# Patient Record
Sex: MALE | Race: WHITE | NOT HISPANIC OR LATINO | URBAN - METROPOLITAN AREA
[De-identification: names, ages, dates, MRNs, and addresses within clinical notes are randomized per-mention and may not be internally consistent; named-entity substitution may affect disease eponyms.]

---

## 2020-11-10 ENCOUNTER — TELEMEDICINE (OUTPATIENT)
Dept: INTERNAL MEDICINE CLINIC | Facility: CLINIC | Age: 25
End: 2020-11-10
Payer: MEDICARE

## 2020-11-10 DIAGNOSIS — R50.9 FEVER, UNSPECIFIED FEVER CAUSE: Primary | ICD-10-CM

## 2020-11-10 PROCEDURE — 99213 OFFICE O/P EST LOW 20 MIN: CPT | Performed by: INTERNAL MEDICINE

## 2020-11-10 RX ORDER — ESCITALOPRAM OXALATE 10 MG/1
1 TABLET ORAL DAILY
COMMUNITY
Start: 2014-09-04

## 2020-11-10 RX ORDER — FLUTICASONE PROPIONATE 50 MCG
2 SPRAY, SUSPENSION (ML) NASAL DAILY
COMMUNITY
Start: 2014-10-02

## 2020-11-10 RX ORDER — LORAZEPAM 0.5 MG/1
1 TABLET ORAL DAILY PRN
COMMUNITY
Start: 2014-09-04

## 2020-11-10 RX ORDER — ALBUTEROL SULFATE 90 UG/1
2 AEROSOL, METERED RESPIRATORY (INHALATION)
COMMUNITY
Start: 2013-07-15

## 2020-11-10 RX ORDER — MULTIVITAMIN
1 TABLET ORAL DAILY
COMMUNITY

## 2020-11-11 ENCOUNTER — TELEPHONE (OUTPATIENT)
Dept: INTERNAL MEDICINE CLINIC | Facility: CLINIC | Age: 25
End: 2020-11-11

## 2020-11-11 DIAGNOSIS — J02.9 SORE THROAT: Primary | ICD-10-CM

## 2020-11-11 DIAGNOSIS — R50.9 FEVER, UNSPECIFIED FEVER CAUSE: ICD-10-CM

## 2020-11-11 PROCEDURE — U0003 INFECTIOUS AGENT DETECTION BY NUCLEIC ACID (DNA OR RNA); SEVERE ACUTE RESPIRATORY SYNDROME CORONAVIRUS 2 (SARS-COV-2) (CORONAVIRUS DISEASE [COVID-19]), AMPLIFIED PROBE TECHNIQUE, MAKING USE OF HIGH THROUGHPUT TECHNOLOGIES AS DESCRIBED BY CMS-2020-01-R: HCPCS | Performed by: INTERNAL MEDICINE

## 2020-11-11 RX ORDER — AZITHROMYCIN 250 MG/1
250 TABLET, FILM COATED ORAL EVERY 24 HOURS
Qty: 6 TABLET | Refills: 0 | Status: SHIPPED | OUTPATIENT
Start: 2020-11-11 | End: 2020-11-16

## 2020-11-13 ENCOUNTER — TELEMEDICINE (OUTPATIENT)
Dept: INTERNAL MEDICINE CLINIC | Facility: CLINIC | Age: 25
End: 2020-11-13
Payer: MEDICARE

## 2020-11-13 DIAGNOSIS — U07.1 COVID-19 VIRUS INFECTION: Primary | ICD-10-CM

## 2020-11-13 LAB — SARS-COV-2 RNA SPEC QL NAA+PROBE: DETECTED

## 2020-11-13 PROCEDURE — 99213 OFFICE O/P EST LOW 20 MIN: CPT | Performed by: INTERNAL MEDICINE

## 2022-11-02 RX ORDER — FINASTERIDE 1 MG/1
1 TABLET, FILM COATED ORAL DAILY
COMMUNITY
Start: 2022-07-31

## 2022-11-03 ENCOUNTER — APPOINTMENT (OUTPATIENT)
Dept: LAB | Facility: AMBULARY SURGERY CENTER | Age: 27
End: 2022-11-03

## 2022-11-03 ENCOUNTER — OFFICE VISIT (OUTPATIENT)
Dept: INTERNAL MEDICINE CLINIC | Facility: CLINIC | Age: 27
End: 2022-11-03

## 2022-11-03 VITALS
BODY MASS INDEX: 34.18 KG/M2 | HEIGHT: 67 IN | DIASTOLIC BLOOD PRESSURE: 86 MMHG | OXYGEN SATURATION: 95 % | WEIGHT: 217.8 LBS | HEART RATE: 90 BPM | SYSTOLIC BLOOD PRESSURE: 124 MMHG

## 2022-11-03 DIAGNOSIS — Z13.29 SCREENING FOR ENDOCRINE, NUTRITIONAL, METABOLIC AND IMMUNITY DISORDER: ICD-10-CM

## 2022-11-03 DIAGNOSIS — Z13.0 SCREENING FOR ENDOCRINE, NUTRITIONAL, METABOLIC AND IMMUNITY DISORDER: ICD-10-CM

## 2022-11-03 DIAGNOSIS — Z13.29 SCREENING FOR THYROID DISORDER: ICD-10-CM

## 2022-11-03 DIAGNOSIS — Z13.0 SCREENING, DEFICIENCY ANEMIA, IRON: ICD-10-CM

## 2022-11-03 DIAGNOSIS — Z13.21 SCREENING FOR ENDOCRINE, NUTRITIONAL, METABOLIC AND IMMUNITY DISORDER: ICD-10-CM

## 2022-11-03 DIAGNOSIS — Z13.220 LIPID SCREENING: ICD-10-CM

## 2022-11-03 DIAGNOSIS — R20.2 PARESTHESIA AND PAIN OF LEFT EXTREMITY: ICD-10-CM

## 2022-11-03 DIAGNOSIS — M79.609 PARESTHESIA AND PAIN OF LEFT EXTREMITY: ICD-10-CM

## 2022-11-03 DIAGNOSIS — Z13.1 SCREENING FOR DIABETES MELLITUS: ICD-10-CM

## 2022-11-03 DIAGNOSIS — M79.605 PAIN OF LEFT LOWER EXTREMITY: Primary | ICD-10-CM

## 2022-11-03 DIAGNOSIS — Z79.899 DRUG THERAPY: ICD-10-CM

## 2022-11-03 DIAGNOSIS — Z13.228 SCREENING FOR ENDOCRINE, NUTRITIONAL, METABOLIC AND IMMUNITY DISORDER: ICD-10-CM

## 2022-11-03 DIAGNOSIS — M79.605 PAIN OF LEFT LOWER EXTREMITY: ICD-10-CM

## 2022-11-03 LAB
25(OH)D3 SERPL-MCNC: 23.5 NG/ML (ref 30–100)
ALBUMIN SERPL BCP-MCNC: 4.4 G/DL (ref 3.5–5)
ALP SERPL-CCNC: 58 U/L (ref 46–116)
ALT SERPL W P-5'-P-CCNC: 40 U/L (ref 12–78)
ANION GAP SERPL CALCULATED.3IONS-SCNC: 8 MMOL/L (ref 4–13)
AST SERPL W P-5'-P-CCNC: 21 U/L (ref 5–45)
BASOPHILS # BLD AUTO: 0.05 THOUSANDS/ÂΜL (ref 0–0.1)
BASOPHILS NFR BLD AUTO: 1 % (ref 0–1)
BILIRUB SERPL-MCNC: 0.92 MG/DL (ref 0.2–1)
BUN SERPL-MCNC: 11 MG/DL (ref 5–25)
CALCIUM SERPL-MCNC: 9.7 MG/DL (ref 8.3–10.1)
CHLORIDE SERPL-SCNC: 103 MMOL/L (ref 96–108)
CHOLEST SERPL-MCNC: 138 MG/DL
CO2 SERPL-SCNC: 25 MMOL/L (ref 21–32)
CREAT SERPL-MCNC: 1.15 MG/DL (ref 0.6–1.3)
CRP SERPL QL: 4.9 MG/L
EOSINOPHIL # BLD AUTO: 0.04 THOUSAND/ÂΜL (ref 0–0.61)
EOSINOPHIL NFR BLD AUTO: 1 % (ref 0–6)
ERYTHROCYTE [DISTWIDTH] IN BLOOD BY AUTOMATED COUNT: 12.4 % (ref 11.6–15.1)
ERYTHROCYTE [SEDIMENTATION RATE] IN BLOOD: 9 MM/HOUR (ref 0–14)
GFR SERPL CREATININE-BSD FRML MDRD: 85 ML/MIN/1.73SQ M
GLUCOSE P FAST SERPL-MCNC: 93 MG/DL (ref 65–99)
HCT VFR BLD AUTO: 44.8 % (ref 36.5–49.3)
HDLC SERPL-MCNC: 49 MG/DL
HGB BLD-MCNC: 14.7 G/DL (ref 12–17)
IMM GRANULOCYTES # BLD AUTO: 0.01 THOUSAND/UL (ref 0–0.2)
IMM GRANULOCYTES NFR BLD AUTO: 0 % (ref 0–2)
LDLC SERPL CALC-MCNC: 71 MG/DL (ref 0–100)
LYMPHOCYTES # BLD AUTO: 2.63 THOUSANDS/ÂΜL (ref 0.6–4.47)
LYMPHOCYTES NFR BLD AUTO: 39 % (ref 14–44)
MAGNESIUM SERPL-MCNC: 2.3 MG/DL (ref 1.6–2.6)
MCH RBC QN AUTO: 28.3 PG (ref 26.8–34.3)
MCHC RBC AUTO-ENTMCNC: 32.8 G/DL (ref 31.4–37.4)
MCV RBC AUTO: 86 FL (ref 82–98)
MONOCYTES # BLD AUTO: 0.68 THOUSAND/ÂΜL (ref 0.17–1.22)
MONOCYTES NFR BLD AUTO: 10 % (ref 4–12)
NEUTROPHILS # BLD AUTO: 3.39 THOUSANDS/ÂΜL (ref 1.85–7.62)
NEUTS SEG NFR BLD AUTO: 49 % (ref 43–75)
NONHDLC SERPL-MCNC: 89 MG/DL
NRBC BLD AUTO-RTO: 0 /100 WBCS
PLATELET # BLD AUTO: 287 THOUSANDS/UL (ref 149–390)
PMV BLD AUTO: 11.1 FL (ref 8.9–12.7)
POTASSIUM SERPL-SCNC: 3.4 MMOL/L (ref 3.5–5.3)
PROT SERPL-MCNC: 7.8 G/DL (ref 6.4–8.4)
RBC # BLD AUTO: 5.19 MILLION/UL (ref 3.88–5.62)
SODIUM SERPL-SCNC: 136 MMOL/L (ref 135–147)
TRIGL SERPL-MCNC: 92 MG/DL
TSH SERPL DL<=0.05 MIU/L-ACNC: 1.34 UIU/ML (ref 0.45–4.5)
VIT B12 SERPL-MCNC: 553 PG/ML (ref 100–900)
WBC # BLD AUTO: 6.8 THOUSAND/UL (ref 4.31–10.16)

## 2022-11-03 NOTE — PROGRESS NOTES
Assessment/Plan:    1  Pain of left lower extremity  -     Comprehensive metabolic panel; Future  -     TSH, 3rd generation with Free T4 reflex; Future  -     Magnesium  -     Sedimentation rate, automated; Future  -     C-reactive protein; Future  -     CBC and differential; Future  -     Lipid panel; Future  -     Vitamin B12; Future  -     Vitamin D 25 hydroxy; Future    2  Screening for thyroid disorder  -     TSH, 3rd generation with Free T4 reflex; Future    3  Screening for diabetes mellitus  -     Comprehensive metabolic panel; Future    4  Screening, deficiency anemia, iron  -     CBC and differential; Future    5  Screening for endocrine, nutritional, metabolic and immunity disorder  -     Comprehensive metabolic panel; Future  -     TSH, 3rd generation with Free T4 reflex; Future  -     Magnesium  -     Sedimentation rate, automated; Future  -     C-reactive protein; Future  -     CBC and differential; Future  -     Lipid panel; Future  -     Vitamin B12; Future  -     Vitamin D 25 hydroxy; Future    6  Paresthesia and pain of left extremity  -     Comprehensive metabolic panel; Future  -     TSH, 3rd generation with Free T4 reflex; Future  -     Magnesium  -     Sedimentation rate, automated; Future  -     C-reactive protein; Future  -     CBC and differential; Future  -     Lipid panel; Future  -     Vitamin B12; Future  -     Vitamin D 25 hydroxy; Future    7  Drug therapy  -     Lipid panel; Future  -     Vitamin D 25 hydroxy; Future    8  Lipid screening  -     Lipid panel; Future     The case discussed with patient using patient centered shared decision making  The patient was counseled regarding instructions for management,-- risk factor reductions,-- prognosis,-- impressions,-- risks and benefits of treatment options,-- importance of compliance with treatment  I have reviewed the instructions with the patient, answering all questions to his satisfaction      Exam normal/unrevealing  Query CARLOS EDUARDO cause  No evidence of neurovascular problem, infection, etc  Labs ordered r/o occult etiology  Encouraged to increased daily activity  Will call if not better by next week  BMI Counseling: Body mass index is 34 11 kg/m²  The BMI is above normal  Nutrition recommendations include decreasing portion sizes, encouraging healthy choices of fruits and vegetables, moderation in carbohydrate intake and increasing intake of lean protein  Exercise recommendations include exercising 3-5 times per week  Rationale for BMI follow-up plan is due to patient being overweight or obese  Depression Screening and Follow-up Plan: Patient was screened for depression during today's encounter  They screened negative with a PHQ-2 score of 0  Follow a healthy diet with low fat, low cholesterol, low sugar  There are no Patient Instructions on file for this visit  Return for Annual physical     Subjective:      Patient ID: Toi Chan is a 32 y o  male  Chief Complaint   Patient presents with   • Leg Problem     Left thigh burning and throbbing since yesterday       Patient is a 32 yr old male with c/o left outer thigh pain for 1 day that is burning and constant  Pt denies back pain, or change in activity  Pt states he is physically inactive and has a poor diet  works from home on [employer]  Spends most of day sitting  Denies fever, chills, rash, infection  Weight bearing not affected  Has not treated current pain  The following portions of the patient's history were reviewed and updated as appropriate: allergies, current medications, past family history, past medical history, past social history, past surgical history and problem list     Review of Systems   Constitutional: Negative  Negative for fatigue and fever  Respiratory: Negative  Cardiovascular: Negative  Musculoskeletal: Positive for myalgias  Negative for back pain, gait problem and joint swelling  Skin: Negative      Neurological: Negative  Psychiatric/Behavioral: Negative  Current Outpatient Medications   Medication Sig Dispense Refill   • finasteride (PROPECIA) 1 MG tablet Take 1 mg by mouth daily     • Multiple Vitamin (Multi-Vitamin Daily) TABS Take 1 tablet by mouth daily     • albuterol (PROVENTIL HFA,VENTOLIN HFA) 90 mcg/act inhaler Inhale 2 puffs (Patient not taking: Reported on 11/3/2022)     • Cholecalciferol 50 MCG (2000 UT) CAPS Take 1 capsule by mouth daily (Patient not taking: Reported on 11/3/2022)     • escitalopram (LEXAPRO) 10 mg tablet Take 1 tablet by mouth daily (Patient not taking: Reported on 11/3/2022)     • fluticasone (FLONASE) 50 mcg/act nasal spray 2 sprays into each nostril daily (Patient not taking: Reported on 11/3/2022)     • LORazepam (ATIVAN) 0 5 mg tablet Take 1 tablet by mouth daily as needed (Patient not taking: Reported on 11/3/2022)       No current facility-administered medications for this visit  Objective:    /86   Pulse 90   Ht 5' 7" (1 702 m)   Wt 98 8 kg (217 lb 12 8 oz)   SpO2 95%   BMI 34 11 kg/m²        Physical Exam  Vitals and nursing note reviewed  Constitutional:       General: He is not in acute distress  Appearance: Normal appearance  He is obese  He is not ill-appearing  Cardiovascular:      Rate and Rhythm: Normal rate and regular rhythm  Pulses: Normal pulses  Popliteal pulses are 2+ on the left side  Dorsalis pedis pulses are 2+ on the left side  Posterior tibial pulses are 2+ on the left side  Heart sounds: Normal heart sounds  Pulmonary:      Effort: Pulmonary effort is normal       Breath sounds: Normal breath sounds  Musculoskeletal:      Left upper leg: Normal         Legs:       Comments: Gait normal   Skin:     General: Skin is warm and dry  Coloration: Skin is not pale  Neurological:      General: No focal deficit present  Mental Status: He is alert  Sensory: No sensory deficit  Motor: No weakness                  Je Dennis

## 2022-11-15 ENCOUNTER — OFFICE VISIT (OUTPATIENT)
Dept: INTERNAL MEDICINE CLINIC | Facility: CLINIC | Age: 27
End: 2022-11-15

## 2022-11-15 VITALS
RESPIRATION RATE: 16 BRPM | DIASTOLIC BLOOD PRESSURE: 88 MMHG | WEIGHT: 221 LBS | SYSTOLIC BLOOD PRESSURE: 128 MMHG | OXYGEN SATURATION: 98 % | BODY MASS INDEX: 34.69 KG/M2 | HEART RATE: 84 BPM | HEIGHT: 67 IN

## 2022-11-15 DIAGNOSIS — F41.9 ANXIETY: ICD-10-CM

## 2022-11-15 DIAGNOSIS — G57.12 LATERAL CUTANEOUS FEMORAL NERVE OF THIGH SYNDROME, LEFT: Primary | ICD-10-CM

## 2022-11-15 DIAGNOSIS — E87.6 LOW BLOOD POTASSIUM: ICD-10-CM

## 2022-11-15 NOTE — PROGRESS NOTES
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, left  Symptoms are compatible with the lateral cutaneous femoral nerve syndrome his symptoms are currently improving about 75% improvement at this point time I did explain to him the diagnostic evaluation clean possibly an EMG, but because his symptoms are improving we have decided/shared decision-making to hold off on EMG is willing to undergo physical therapy I have provided orders I would like stretching exercises of the left lower extremity quadriceps and hamstrings/hips if any recurrence of symptoms he will notify me today of spent time to explain to patient in detail his condition    Low blood potassium  I have counseled patient to increase potassium in diet provided med list of foods that contain potassium rich foods a check a BMP in 1 week         Problem List Items Addressed This Visit        Nervous and Auditory    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, left - Primary     Symptoms are compatible with the lateral cutaneous femoral nerve syndrome his symptoms are currently improving about 75% improvement at this point time I did explain to him the diagnostic evaluation clean possibly an EMG, but because his symptoms are improving we have decided/shared decision-making to hold off on EMG is willing to undergo physical therapy I have provided orders I would like stretching exercises of the left lower extremity quadriceps and hamstrings/hips if any recurrence of symptoms he will notify me today of spent time to explain to patient in detail his condition         Relevant Orders    Ambulatory Referral to Physical Therapy       Other    Low blood potassium     I have counseled patient to increase potassium in diet provided med list of foods that contain potassium rich foods a check a BMP in 1 week         Relevant Orders    Basic metabolic panel    Anxiety     Mild anxiety no SI today I did discuss various options for treatment of the anxiety including counseling, starting a low-dose of SSRI at this point time patient has decided to start exercising going for walk daily and changing his diet and trying to get better sleep; we did discuss sleep hygiene his mom will monitor; he is very motivated and determined to make these changes he reports me that if he does not see a big improvement in his anxiety symptoms he will discuss this further with me at the next visit and consider the SSRI or counseling  Return to office 3  months  call if any problems length of visit 30 minutes and 50% time was spent counseling the patient regarding the lateral cutaneous femoral nerve syndrome, treatment of anxiety and foods are rich in potassium  Subjective:      Patient ID: Dennis Kwong is a 32 y o  male  HPI 33-year old male coming in for a follow up visit regarding lateral cutaneous femoral nerve thigh syndrome left, low potassium level, anxiety; The patient reports me compliant taking medications without untoward side effects the  The patient is here to review his medical condition, update me on the medical condition and the patient reports me no hospitalizations and no ER visits  Patient is accompanied with his mom today today the patient reports me symptoms of anxiety no depression no suicidal ideation he does report me at times he does become anxious going to store with his mom    He reports me the discomfort of the lateral thigh is improving about 75% no weakness no back pain  Was no previous injury  Recent laboratories reviewed that showed a mild low potassium level  left later thigh improving , anxiety mid , medical anxiety , pobia of needles  No depression bad sleep schedule and cycles  No si walking 30min, poor diet ,     The following portions of the patient's history were reviewed and updated as appropriate: allergies, current medications, past family history, past medical history, past social history, past surgical history and problem list     Review of Systems   Constitutional: Negative for activity change, appetite change and unexpected weight change  HENT: Negative for congestion  Eyes: Negative for visual disturbance  Respiratory: Negative for cough and shortness of breath  Cardiovascular: Negative for chest pain  Gastrointestinal: Negative for abdominal pain, diarrhea, nausea and vomiting  Musculoskeletal:        Lateral hip discomfort improving   Neurological: Negative for dizziness, light-headedness and headaches  Hematological: Negative for adenopathy  Psychiatric/Behavioral: Negative for suicidal ideas  The patient is nervous/anxious  Objective:    Return in about 3 months (around 2/15/2023)  No results found  Allergies   Allergen Reactions   • Amoxicillin-Pot Clavulanate Vomiting       History reviewed  No pertinent past medical history  History reviewed  No pertinent surgical history    Current Outpatient Medications on File Prior to Visit   Medication Sig Dispense Refill   • Cholecalciferol (Vitamin D3) 125 MCG (5000 UT) TABS Take 5,000 Units by mouth daily     • finasteride (PROPECIA) 1 MG tablet Take 1 mg by mouth daily     • Multiple Vitamin (Multi-Vitamin Daily) TABS Take 1 tablet by mouth daily     • albuterol (PROVENTIL HFA,VENTOLIN HFA) 90 mcg/act inhaler Inhale 2 puffs (Patient not taking: No sig reported)     • Cholecalciferol 50 MCG (2000 UT) CAPS Take 1 capsule by mouth daily (Patient not taking: Reported on 11/15/2022)     • escitalopram (LEXAPRO) 10 mg tablet Take 1 tablet by mouth daily (Patient not taking: No sig reported)     • fluticasone (FLONASE) 50 mcg/act nasal spray 2 sprays into each nostril daily (Patient not taking: No sig reported)     • LORazepam (ATIVAN) 0 5 mg tablet Take 1 tablet by mouth daily as needed (Patient not taking: No sig reported)       No current facility-administered medications on file prior to visit       Family History   Problem Relation Age of Onset   • No Known Problems Mother    • Ulcerative colitis Father    • No Known Problems Brother    • Crohn's disease Brother      Social History     Socioeconomic History   • Marital status: Single     Spouse name: Not on file   • Number of children: Not on file   • Years of education: Not on file   • Highest education level: Not on file   Occupational History   • Not on file   Tobacco Use   • Smoking status: Never Smoker   • Smokeless tobacco: Never Used   Vaping Use   • Vaping Use: Never used   Substance and Sexual Activity   • Alcohol use: Never   • Drug use: Never   • Sexual activity: Not Currently   Other Topics Concern   • Not on file   Social History Narrative   • Not on file     Social Determinants of Health     Financial Resource Strain: Not on file   Food Insecurity: Not on file   Transportation Needs: Not on file   Physical Activity: Not on file   Stress: Not on file   Social Connections: Not on file   Intimate Partner Violence: Not on file   Housing Stability: Not on file     Vitals:    11/15/22 1100   BP: 128/88   Pulse: 84   Resp: 16   SpO2: 98%   Weight: 100 kg (221 lb)   Height: 5' 7" (1 702 m)     Results for orders placed or performed in visit on 11/03/22   Comprehensive metabolic panel   Result Value Ref Range    Sodium 136 135 - 147 mmol/L    Potassium 3 4 (L) 3 5 - 5 3 mmol/L    Chloride 103 96 - 108 mmol/L    CO2 25 21 - 32 mmol/L    ANION GAP 8 4 - 13 mmol/L    BUN 11 5 - 25 mg/dL    Creatinine 1 15 0 60 - 1 30 mg/dL    Glucose, Fasting 93 65 - 99 mg/dL    Calcium 9 7 8 3 - 10 1 mg/dL    AST 21 5 - 45 U/L    ALT 40 12 - 78 U/L    Alkaline Phosphatase 58 46 - 116 U/L    Total Protein 7 8 6 4 - 8 4 g/dL    Albumin 4 4 3 5 - 5 0 g/dL    Total Bilirubin 0 92 0 20 - 1 00 mg/dL    eGFR 85 ml/min/1 73sq m   TSH, 3rd generation with Free T4 reflex   Result Value Ref Range    TSH 3RD GENERATON 1 340 0 450 - 4 500 uIU/mL   Sedimentation rate, automated   Result Value Ref Range    Sed Rate 9 0 - 14 mm/hour   C-reactive protein   Result Value Ref Range    CRP 4 9 (H) <3 0 mg/L   CBC and differential   Result Value Ref Range    WBC 6 80 4 31 - 10 16 Thousand/uL    RBC 5 19 3 88 - 5 62 Million/uL    Hemoglobin 14 7 12 0 - 17 0 g/dL    Hematocrit 44 8 36 5 - 49 3 %    MCV 86 82 - 98 fL    MCH 28 3 26 8 - 34 3 pg    MCHC 32 8 31 4 - 37 4 g/dL    RDW 12 4 11 6 - 15 1 %    MPV 11 1 8 9 - 12 7 fL    Platelets 276 715 - 519 Thousands/uL    nRBC 0 /100 WBCs    Neutrophils Relative 49 43 - 75 %    Immat GRANS % 0 0 - 2 %    Lymphocytes Relative 39 14 - 44 %    Monocytes Relative 10 4 - 12 %    Eosinophils Relative 1 0 - 6 %    Basophils Relative 1 0 - 1 %    Neutrophils Absolute 3 39 1 85 - 7 62 Thousands/µL    Immature Grans Absolute 0 01 0 00 - 0 20 Thousand/uL    Lymphocytes Absolute 2 63 0 60 - 4 47 Thousands/µL    Monocytes Absolute 0 68 0 17 - 1 22 Thousand/µL    Eosinophils Absolute 0 04 0 00 - 0 61 Thousand/µL    Basophils Absolute 0 05 0 00 - 0 10 Thousands/µL   Lipid panel   Result Value Ref Range    Cholesterol 138 See Comment mg/dL    Triglycerides 92 See Comment mg/dL    HDL, Direct 49 >=40 mg/dL    LDL Calculated 71 0 - 100 mg/dL    Non-HDL-Chol (CHOL-HDL) 89 mg/dl   Vitamin B12   Result Value Ref Range    Vitamin B-12 553 100 - 900 pg/mL   Vitamin D 25 hydroxy   Result Value Ref Range    Vit D, 25-Hydroxy 23 5 (L) 30 0 - 100 0 ng/mL     Weight (last 2 days) Date/Time Weight    11/15/22 1100 100 (221)        Body mass index is 34 61 kg/m²  BP      Temp      Pulse     Resp      SpO2        Vitals:    11/15/22 1100   Weight: 100 kg (221 lb)     Vitals:    11/15/22 1100   Weight: 100 kg (221 lb)       /88   Pulse 84   Resp 16   Ht 5' 7" (1 702 m)   Wt 100 kg (221 lb)   SpO2 98%   BMI 34 61 kg/m²          Physical Exam  Vitals and nursing note reviewed  Constitutional:       General: He is not in acute distress  Appearance: He is well-developed  He is not diaphoretic  HENT:      Head: Normocephalic and atraumatic  Right Ear: External ear normal       Left Ear: External ear normal    Eyes:      General: No scleral icterus  Right eye: No discharge  Left eye: No discharge  Conjunctiva/sclera: Conjunctivae normal       Pupils: Pupils are equal, round, and reactive to light  Cardiovascular:      Rate and Rhythm: Normal rate and regular rhythm  Heart sounds: Normal heart sounds  No murmur heard  No friction rub  No gallop  Pulmonary:      Effort: No respiratory distress  Breath sounds: No wheezing or rales  Abdominal:      General: Bowel sounds are normal  There is no distension  Palpations: Abdomen is soft  There is no mass  Tenderness: There is no abdominal tenderness  There is no guarding or rebound  Musculoskeletal:         General: No deformity  Cervical back: Neck supple  Lymphadenopathy:      Cervical: No cervical adenopathy  Neurological:      Mental Status: He is alert  Psychiatric:         Mood and Affect: Mood is anxious  Mood is not depressed  Thought Content: Thought content does not include suicidal ideation

## 2022-11-16 NOTE — ASSESSMENT & PLAN NOTE
Mild anxiety no SI today I did discuss various options for treatment of the anxiety including counseling, starting a low-dose of SSRI at this point time patient has decided to start exercising going for walk daily and changing his diet and trying to get better sleep; we did discuss sleep hygiene his mom will monitor; he is very motivated and determined to make these changes he reports me that if he does not see a big improvement in his anxiety symptoms he will discuss this further with me at the next visit and consider the SSRI or counseling

## 2022-11-16 NOTE — ASSESSMENT & PLAN NOTE
I have counseled patient to increase potassium in diet provided med list of foods that contain potassium rich foods a check a BMP in 1 week

## 2022-11-16 NOTE — ASSESSMENT & PLAN NOTE
Symptoms are compatible with the lateral cutaneous femoral nerve syndrome his symptoms are currently improving about 75% improvement at this point time I did explain to him the diagnostic evaluation clean possibly an EMG, but because his symptoms are improving we have decided/shared decision-making to hold off on EMG is willing to undergo physical therapy I have provided orders I would like stretching exercises of the left lower extremity quadriceps and hamstrings/hips if any recurrence of symptoms he will notify me today of spent time to explain to patient in detail his condition

## 2023-05-31 ENCOUNTER — TELEPHONE (OUTPATIENT)
Dept: INTERNAL MEDICINE CLINIC | Facility: CLINIC | Age: 28
End: 2023-05-31

## 2023-05-31 NOTE — TELEPHONE ENCOUNTER
Pt received letter for jury duty and would like a letter to excuse due to anxiety and panic attacks  Juror # [id number] 08/01/2023   Fax # [phone]

## 2023-06-08 ENCOUNTER — OFFICE VISIT (OUTPATIENT)
Dept: INTERNAL MEDICINE CLINIC | Facility: CLINIC | Age: 28
End: 2023-06-08
Payer: MEDICARE

## 2023-06-08 VITALS
SYSTOLIC BLOOD PRESSURE: 126 MMHG | HEART RATE: 87 BPM | BODY MASS INDEX: 34.09 KG/M2 | HEIGHT: 67 IN | OXYGEN SATURATION: 97 % | WEIGHT: 217.2 LBS | DIASTOLIC BLOOD PRESSURE: 84 MMHG

## 2023-06-08 DIAGNOSIS — H93.13 BILATERAL TINNITUS: Primary | ICD-10-CM

## 2023-06-08 PROCEDURE — 99213 OFFICE O/P EST LOW 20 MIN: CPT | Performed by: INTERNAL MEDICINE

## 2023-06-08 NOTE — PATIENT INSTRUCTIONS
-A referral has been made to ENT for further evaluation of your tinnitus  -Recommend sleeping with a white noise machine in your room to help drown out the ringing in your ears

## 2023-06-08 NOTE — ASSESSMENT & PLAN NOTE
-Recommended further evaluation with ENT  A referral has been made  -Recommended that he sleep with a white noise machine in his room to help minimize his perceived tinnitus

## 2023-06-08 NOTE — PROGRESS NOTES
Name: Vargas Romano      : 1995      MRN: [id number]  Encounter Provider: Nolan Roberto MD  Encounter Date: 2023   Enc[facility]    Assessment & Plan     1  Bilateral tinnitus  Assessment & Plan:  -Recommended further evaluation with ENT  A referral has been made  -Recommended that he sleep with a white noise machine in his room to help minimize his perceived tinnitus  Orders:  -     Ambulatory Referral to Otolaryngology; Future           Subjective      HPI   The patient presents today complaining of ringing in his ears (left greater than right)  He reports this started 4 days ago  He denies working around loud noises such as machinery  He states he had a similar episode several years ago which resolved on its own  He reports he had a hearing test done with audiology at the time that was normal     He states the ringing in his ears is persistent and does not have a pulsatile quality  He denies any recent URI, hearing loss or vertigo      ROS as per HPI    Current Outpatient Medications on File Prior to Visit   Medication Sig   • Cholecalciferol (Vitamin D3) 125 MCG (5000 UT) TABS Take 5,000 Units by mouth daily   • finasteride (PROPECIA) 1 MG tablet Take 1 mg by mouth daily   • Multiple Vitamin (Multi-Vitamin Daily) TABS Take 1 tablet by mouth daily   • albuterol (PROVENTIL HFA,VENTOLIN HFA) 90 mcg/act inhaler Inhale 2 puffs (Patient not taking: Reported on 11/3/2022)   • Cholecalciferol 50 MCG (2000 UT) CAPS Take 1 capsule by mouth daily (Patient not taking: Reported on 11/15/2022)   • escitalopram (LEXAPRO) 10 mg tablet Take 1 tablet by mouth daily (Patient not taking: Reported on 11/3/2022)   • fluticasone (FLONASE) 50 mcg/act nasal spray 2 sprays into each nostril daily (Patient not taking: Reported on 11/3/2022)   • LORazepam (ATIVAN) 0 5 mg tablet Take 1 tablet by mouth daily as needed (Patient not taking: Reported on 11/3/2022) "      Objective     /84   Pulse 87   Ht 5' 7\" (1 702 m)   Wt 98 5 kg (217 lb 3 2 oz)   SpO2 97%   BMI 34 02 kg/m²     BP Readings from Last 3 Encounters:   06/08/23 126/84   11/15/22 128/88   11/03/22 124/86        Wt Readings from Last 3 Encounters:   06/08/23 98 5 kg (217 lb 3 2 oz)   11/15/22 100 kg (221 lb)   11/03/22 98 8 kg (217 lb 12 8 oz)       Physical Exam    General: NAD, Alert and oriented x3   HEENT: NCAT, EOMI, normal conjunctiva, external auditory canals clear, TMs normal in appearance  Cardiovascular: RRR, normal S1 and S2, no m/r/g  Pulmonary: Normal respiratory effort, no wheezes, rales or rhonchi  Neuro: Non-focal, ambulating without difficulty, non-antalgic gait  Extremities: No lower extremity edema  Skin: Normal skin color, no rashes     Marc Hernandez MD  "

## 2023-06-22 ENCOUNTER — TELEPHONE (OUTPATIENT)
Dept: INTERNAL MEDICINE CLINIC | Facility: CLINIC | Age: 28
End: 2023-06-22

## 2023-06-22 NOTE — TELEPHONE ENCOUNTER
I triage the call and spoke with pt mom , pt has no chest  pain , No suicidal though, or paranoia, no palpitations pt has extreme anxiety, after reviewing all the question's  I scheduled an appointment for tomorrow with Dr Marito Munoz   Advised pt mom if symptoms of anxiety are worsen he should go to ED

## 2023-06-22 NOTE — TELEPHONE ENCOUNTER
The patient's mother called  Rita Bryant has been having a lot of anxiety and panic attacks  When he has a panic attack he gets sweaty,rining in his ears, shaking, red face,heavy breathing  Mom said she needs to be close to him when he has an attack  Nadia Szymanski said she called us because Rita Bryant is not up to calling       This was triaged to the clinical team

## 2023-06-23 ENCOUNTER — OFFICE VISIT (OUTPATIENT)
Dept: INTERNAL MEDICINE CLINIC | Facility: CLINIC | Age: 28
End: 2023-06-23
Payer: MEDICARE

## 2023-06-23 VITALS
DIASTOLIC BLOOD PRESSURE: 88 MMHG | BODY MASS INDEX: 34.5 KG/M2 | SYSTOLIC BLOOD PRESSURE: 120 MMHG | HEIGHT: 67 IN | OXYGEN SATURATION: 98 % | WEIGHT: 219.8 LBS | HEART RATE: 88 BPM

## 2023-06-23 DIAGNOSIS — F41.9 ANXIETY: Primary | ICD-10-CM

## 2023-06-23 DIAGNOSIS — F41.0 PANIC ATTACKS: ICD-10-CM

## 2023-06-23 PROCEDURE — 99213 OFFICE O/P EST LOW 20 MIN: CPT | Performed by: INTERNAL MEDICINE

## 2023-06-23 RX ORDER — ALPRAZOLAM 0.25 MG/1
0.25 TABLET ORAL DAILY PRN
Qty: 8 TABLET | Refills: 0 | Status: SHIPPED | OUTPATIENT
Start: 2023-06-23

## 2023-06-23 RX ORDER — ESCITALOPRAM OXALATE 5 MG/1
5 TABLET ORAL DAILY
Qty: 30 TABLET | Refills: 1 | Status: SHIPPED | OUTPATIENT
Start: 2023-06-23

## 2023-06-23 NOTE — PATIENT INSTRUCTIONS
-You will be started on a low-dose of Lexapro 5 mg daily  -For acute panic attacks you will be given a small supply of Xanax  -Consider a counseling session with Jessika Velázquez  -Plan to follow-up in 6 weeks to reassess response

## 2023-06-23 NOTE — PROGRESS NOTES
Name: Darline Alexandra      : 1995      MRN: [id number]  Encounter Provider: Mitesh Alvarado MD  Encounter Date: 2023   [facility] [address]     1  Anxiety  -     escitalopram (LEXAPRO) 5 mg tablet; Take 1 tablet (5 mg total) by mouth daily  -     ALPRAZolam (XANAX) 0 25 mg tablet; Take 1 tablet (0 25 mg total) by mouth daily as needed for anxiety    2  Panic attacks  -     escitalopram (LEXAPRO) 5 mg tablet; Take 1 tablet (5 mg total) by mouth daily  -     ALPRAZolam (XANAX) 0 25 mg tablet; Take 1 tablet (0 25 mg total) by mouth daily as needed for anxiety    Patient is in agreement with restarting Lexapro for management of his panic attacks  He will be started on 5 mg daily  I have given him a small supply of Xanax to use as needed for breakthrough panic attacks  I recommended that he follow-up in 6 weeks in our office to assess his response to therapy  Subjective      HPI  Patient presents today complaining of increased anxiety  Of note, he has a history of anxiety and panic attacks and was previously on Lexapro  Today the patient reports he has been experiencing more anxiety ever since he developed tinnitus  He is requesting a small supply of Xanax to take as needed for his panic attacks        ROS as per HPI    Current Outpatient Medications on File Prior to Visit   Medication Sig   • Cholecalciferol (Vitamin D3) 125 MCG (5000 UT) TABS Take 5,000 Units by mouth daily   • finasteride (PROPECIA) 1 MG tablet Take 1 mg by mouth daily   • Multiple Vitamin (Multi-Vitamin Daily) TABS Take 1 tablet by mouth daily   • albuterol (PROVENTIL HFA,VENTOLIN HFA) 90 mcg/act inhaler Inhale 2 puffs (Patient not taking: Reported on 11/3/2022)   • Cholecalciferol 50 MCG (2000 UT) CAPS Take 1 capsule by mouth daily (Patient not taking: Reported on 11/15/2022)   • fluticasone (FLONASE) 50 mcg/act nasal spray 2 sprays into each nostril daily (Patient "not taking: Reported on 11/3/2022)   • [DISCONTINUED] escitalopram (LEXAPRO) 10 mg tablet Take 1 tablet by mouth daily (Patient not taking: Reported on 11/3/2022)   • [DISCONTINUED] LORazepam (ATIVAN) 0 5 mg tablet Take 1 tablet by mouth daily as needed (Patient not taking: Reported on 11/3/2022)       Objective     /88   Pulse 88   Ht 5' 7\" (1 702 m)   Wt 99 7 kg (219 lb 12 8 oz)   SpO2 98%   BMI 34 43 kg/m²     BP Readings from Last 3 Encounters:   06/23/23 120/88   06/08/23 126/84   11/15/22 128/88        Wt Readings from Last 3 Encounters:   06/23/23 99 7 kg (219 lb 12 8 oz)   06/08/23 98 5 kg (217 lb 3 2 oz)   11/15/22 100 kg (221 lb)       Physical Exam    General: NAD, Alert and oriented x3   HEENT: NCAT, EOMI, normal conjunctiva  Cardiovascular: RRR, normal S1 and S2, no m/r/g  Pulmonary: Normal respiratory effort, no wheezes, rales or rhonchi  Extremities: No lower extremity edema  Skin: Normal skin color, no rashes     Marc Lentz MD  "

## 2023-08-21 ENCOUNTER — RA CDI HCC (OUTPATIENT)
Dept: OTHER | Facility: HOSPITAL | Age: 28
End: 2023-08-21

## 2023-08-25 ENCOUNTER — OFFICE VISIT (OUTPATIENT)
Dept: INTERNAL MEDICINE CLINIC | Facility: CLINIC | Age: 28
End: 2023-08-25
Payer: MEDICARE

## 2023-08-25 VITALS
DIASTOLIC BLOOD PRESSURE: 74 MMHG | HEIGHT: 67 IN | WEIGHT: 204.4 LBS | OXYGEN SATURATION: 98 % | RESPIRATION RATE: 16 BRPM | SYSTOLIC BLOOD PRESSURE: 124 MMHG | BODY MASS INDEX: 32.08 KG/M2 | HEART RATE: 82 BPM

## 2023-08-25 DIAGNOSIS — E87.6 LOW BLOOD POTASSIUM: ICD-10-CM

## 2023-08-25 DIAGNOSIS — Z00.00 MEDICARE ANNUAL WELLNESS VISIT, SUBSEQUENT: Primary | ICD-10-CM

## 2023-08-25 DIAGNOSIS — Z13.29 SCREENING FOR THYROID DISORDER: ICD-10-CM

## 2023-08-25 DIAGNOSIS — F41.9 ANXIETY: ICD-10-CM

## 2023-08-25 DIAGNOSIS — E55.9 VITAMIN D DEFICIENCY: ICD-10-CM

## 2023-08-25 DIAGNOSIS — L65.9 HAIR LOSS: ICD-10-CM

## 2023-08-25 DIAGNOSIS — Z13.6 SCREENING FOR CARDIOVASCULAR CONDITION: ICD-10-CM

## 2023-08-25 DIAGNOSIS — E66.09 CLASS 1 OBESITY DUE TO EXCESS CALORIES WITHOUT SERIOUS COMORBIDITY WITH BODY MASS INDEX (BMI) OF 32.0 TO 32.9 IN ADULT: ICD-10-CM

## 2023-08-25 PROCEDURE — G0438 PPPS, INITIAL VISIT: HCPCS | Performed by: INTERNAL MEDICINE

## 2023-08-25 PROCEDURE — 99213 OFFICE O/P EST LOW 20 MIN: CPT | Performed by: INTERNAL MEDICINE

## 2023-08-25 RX ORDER — FINASTERIDE 1 MG/1
TABLET, FILM COATED ORAL
Start: 2023-08-25

## 2023-08-25 NOTE — PATIENT INSTRUCTIONS
Medicare Preventive Visit Patient Instructions  Thank you for completing your Welcome to Medicare Visit or Medicare Annual Wellness Visit today. Your next wellness visit will be due in one year (8/25/2024). The screening/preventive services that you may require over the next 5-10 years are detailed below. Some tests may not apply to you based off risk factors and/or age. Screening tests ordered at today's visit but not completed yet may show as past due. Also, please note that scanned in results may not display below. Preventive Screenings:  Service Recommendations Previous Testing/Comments   Colorectal Cancer Screening  · Colonoscopy    · Fecal Occult Blood Test (FOBT)/Fecal Immunochemical Test (FIT)  · Fecal DNA/Cologuard Test  · Flexible Sigmoidoscopy Age: 43-73 years old   Colonoscopy: every 10 years (May be performed more frequently if at higher risk)  OR  FOBT/FIT: every 1 year  OR  Cologuard: every 3 years  OR  Sigmoidoscopy: every 5 years  Screening may be recommended earlier than age 39 if at higher risk for colorectal cancer. Also, an individualized decision between you and your healthcare provider will decide whether screening between the ages of 77-80 would be appropriate.  Colonoscopy: Not on file  FOBT/FIT: Not on file  Cologuard: Not on file  Sigmoidoscopy: Not on file          Prostate Cancer Screening Individualized decision between patient and health care provider in men between ages of 53-66   Medicare will cover every 12 months beginning on the day after your 50th birthday PSA: No results in last 5 years           Hepatitis C Screening Once for adults born between [address] [place] [address]  More frequently in patients at high risk for Hepatitis C Hep C Antibody: Not on file        Diabetes Screening 1-2 times per year if you're at risk for diabetes or have pre-diabetes Fasting glucose: 93 mg/dL (11/3/2022)  A1C: No results in last 5 years (No results in last 5 years)      Cholesterol Screening Once every 5 years if you don't have a lipid disorder. May order more often based on risk factors. Lipid panel: 11/03/2022         Other Preventive Screenings Covered by Medicare:  1. Abdominal Aortic Aneurysm (AAA) Screening: covered once if your at risk. You're considered to be at risk if you have a family history of AAA or a male between the age of 70-76 who smoking at least 100 cigarettes in your lifetime. 2. Lung Cancer Screening: covers low dose CT scan once per year if you meet all of the following conditions: (1) Age 48-67; (2) No signs or symptoms of lung cancer; (3) Current smoker or have quit smoking within the last 15 years; (4) You have a tobacco smoking history of at least 20 pack years (packs per day x number of years you smoked); (5) You get a written order from a healthcare provider. 3. Glaucoma Screening: covered annually if you're considered high risk: (1) You have diabetes OR (2) Family history of glaucoma OR (3)  aged 48 and older OR (3)  American aged 72 and older  3. Osteoporosis Screening: covered every 2 years if you meet one of the following conditions: (1) Have a vertebral abnormality; (2) On glucocorticoid therapy for more than 3 months; (3) Have primary hyperparathyroidism; (4) On osteoporosis medications and need to assess response to drug therapy. 5. HIV Screening: covered annually if you're between the age of 14-79. Also covered annually if you are younger than 13 and older than 72 with risk factors for HIV infection. For pregnant patients, it is covered up to 3 times per pregnancy.     Immunizations:  Immunization Recommendations   Influenza Vaccine Annual influenza vaccination during flu season is recommended for all persons aged >= 6 months who do not have contraindications   Pneumococcal Vaccine   * Pneumococcal conjugate vaccine = PCV13 (Prevnar 13), PCV15 (Vaxneuvance), PCV20 (Prevnar 20)  * Pneumococcal polysaccharide vaccine = PPSV23 (Pneumovax) Adults 19-64 years old: 1-3 doses may be recommended based on certain risk factors  Adults 72 years old: 1-2 doses may be recommended based off what pneumonia vaccine you previously received   Hepatitis B Vaccine 3 dose series if at intermediate or high risk (ex: diabetes, end stage renal disease, liver disease)   Tetanus (Td) Vaccine - COST NOT COVERED BY MEDICARE PART B Following completion of primary series, a booster dose should be given every 10 years to maintain immunity against tetanus. Td may also be given as tetanus wound prophylaxis. Tdap Vaccine - COST NOT COVERED BY MEDICARE PART B Recommended at least once for all adults. For pregnant patients, recommended with each pregnancy. Shingles Vaccine (Shingrix) - COST NOT COVERED BY MEDICARE PART B  2 shot series recommended in those aged 48 and above     Health Maintenance Due:      Topic Date Due   • HIV Screening  11/03/2024 (Originally 4/8/2008)   • Hepatitis C Screening  12/03/2024 (Originally 1995)     Immunizations Due:      Topic Date Due   • COVID-19 Vaccine (1) Never done   • Influenza Vaccine (1) 09/01/2023     Advance Directives   What are advance directives? Advance directives are legal documents that state your wishes and plans for medical care. These plans are made ahead of time in case you lose your ability to make decisions for yourself. Advance directives can apply to any medical decision, such as the treatments you want, and if you want to donate organs. What are the types of advance directives? There are many types of advance directives, and each state has rules about how to use them. You may choose a combination of any of the following:  · Living will: This is a written record of the treatment you want. You can also choose which treatments you do not want, which to limit, and which to stop at a certain time. This includes surgery, medicine, IV fluid, and tube feedings. · Durable power of  for healthcare [facility]):   This is a written record that states who you want to make healthcare choices for you when you are unable to make them for yourself. This person, called a proxy, is usually a family member or a friend. You may choose more than 1 proxy. · Do not resuscitate (DNR) order:  A DNR order is used in case your heart stops beating or you stop breathing. It is a request not to have certain forms of treatment, such as CPR. A DNR order may be included in other types of advance directives. · Medical directive: This covers the care that you want if you are in a coma, near death, or unable to make decisions for yourself. You can list the treatments you want for each condition. Treatment may include pain medicine, surgery, blood transfusions, dialysis, IV or tube feedings, and a ventilator (breathing machine). · Values history: This document has questions about your views, beliefs, and how you feel and think about life. This information can help others choose the care that you would choose. Why are advance directives important? An advance directive helps you control your care. Although spoken wishes may be used, it is better to have your wishes written down. Spoken wishes can be misunderstood, or not followed. Treatments may be given even if you do not want them. An advance directive may make it easier for your family to make difficult choices about your care. Weight Management   Why it is important to manage your weight:  Being overweight increases your risk of health conditions such as heart disease, high blood pressure, type 2 diabetes, and certain types of cancer. It can also increase your risk for osteoarthritis, sleep apnea, and other respiratory problems. Aim for a slow, steady weight loss. Even a small amount of weight loss can lower your risk of health problems. How to lose weight safely:  A safe and healthy way to lose weight is to eat fewer calories and get regular exercise.  You can lose up about 1 pound a week by decreasing the number of calories you eat by 500 calories each day. Healthy meal plan for weight management:  A healthy meal plan includes a variety of foods, contains fewer calories, and helps you stay healthy. A healthy meal plan includes the following:  · Eat whole-grain foods more often. A healthy meal plan should contain fiber. Fiber is the part of grains, fruits, and vegetables that is not broken down by your body. Whole-grain foods are healthy and provide extra fiber in your diet. Some examples of whole-grain foods are whole-wheat breads and pastas, oatmeal, brown rice, and bulgur. · Eat a variety of vegetables every day. Include dark, leafy greens such as spinach, kale, elenita greens, and mustard greens. Eat yellow and orange vegetables such as carrots, sweet potatoes, and winter squash. · Eat a variety of fruits every day. Choose fresh or canned fruit (canned in its own juice or light syrup) instead of juice. Fruit juice has very little or no fiber. · Eat low-fat dairy foods. Drink fat-free (skim) milk or 1% milk. Eat fat-free yogurt and low-fat cottage cheese. Try low-fat cheeses such as mozzarella and other reduced-fat cheeses. · Choose meat and other protein foods that are low in fat. Choose beans or other legumes such as split peas or lentils. Choose fish, skinless poultry (chicken or turkey), or lean cuts of red meat (beef or pork). Before you cook meat or poultry, cut off any visible fat. · Use less fat and oil. Try baking foods instead of frying them. Add less fat, such as margarine, sour cream, regular salad dressing and mayonnaise to foods. Eat fewer high-fat foods. Some examples of high-fat foods include french fries, doughnuts, ice cream, and cakes. · Eat fewer sweets. Limit foods and drinks that are high in sugar. This includes candy, cookies, regular soda, and sweetened drinks. Exercise:  Exercise at least 30 minutes per day on most days of the week.  Some examples of exercise include walking, biking, dancing, and swimming. You can also fit in more physical activity by taking the stairs instead of the elevator or parking farther away from stores. Ask your healthcare provider about the best exercise plan for you. © Copyright [employer] 2018 Information is for End User's use only and may not be sold, redistributed or otherwise used for commercial purposes.  All illustrations and images included in CareNotes® are the copyrighted property of [employer] or [address] [place] [address]

## 2023-08-25 NOTE — PROGRESS NOTES
Assessment and Plan:     Problem List Items Addressed This Visit        Other    Low blood potassium    Anxiety     Significant anxiety possible agoraphobia currently not on medications only as needed Xanax 0.25 mg 1 tablet once a day as needed he reports me using very infrequently try not to use this medication I did explain to him that counseling would be best for this condition he will consider it and let me know in the future if interested I would like him to set up his follow-up visit on a day that counselor Ananda Pearl is in the office so I can make an introduction for him he has agreed to this         Medicare annual wellness visit, subsequent - Primary     Assessment and plan 1. Medicare subsequent annual wellness examination overall the patient is clinically stable and doing well, we encouraged the patient to follow a healthy and balanced diet. We recommend that the patient exercise routinely approximately 30 minutes 5 times per week . We have reviewed the patient's vaccines and have made recommendations for updates if necessary annual flu shot in the fall recommend update of the tetanus booster he will let us know when ready, recommend COVID booster patient prefers not to receive this vaccine   . We will be ordering screening laboratories which are age appropriate. Return to the office in 3 to 4 months    call if any problems. Hair loss     We will recheck potassium level         Relevant Medications    finasteride (PROPECIA) 1 MG tablet    Vitamin D deficiency     Continue the current dose of vitamin D we will check a vitamin D level         Relevant Orders    Vitamin D 25 hydroxy    Class 1 obesity due to excess calories with body mass index (BMI) of 32.0 to 32.9 in adult     Obesity -I have counseled patient following healthy and balanced diet, I would like the patient to lose weight, I would like the patient exercise routinely; we will continue monitor the patient's progress.         Other Visit Diagnoses     Screening for cardiovascular condition        Relevant Orders    Comprehensive metabolic panel    Lipid Panel with Direct LDL reflex    Screening for thyroid disorder        Relevant Orders    TSH, 3rd generation      RTO in 3 to 4 months call if any problems  BMI Counseling: Body mass index is 32.01 kg/m². The BMI is above normal. Nutrition recommendations include decreasing portion sizes and moderation in carbohydrate intake. Exercise recommendations include exercising 3-5 times per week. Rationale for BMI follow-up plan is due to patient being overweight or obese. Depression Screening and Follow-up Plan: Patient was screened for depression during today's encounter. They screened negative with a PHQ-2 score of 0. Preventive health issues were discussed with patient, and age appropriate screening tests were ordered as noted in patient's After Visit Summary. Personalized health advice and appropriate referrals for health education or preventive services given if needed, as noted in patient's After Visit Summary. History of Present Illness:     Patient presents for a Medicare Wellness Visit    HPI 33-year old male coming in for a follow up visit regarding anxiety, vitamin D deficiency, hair loss, obesity and low potassium level; the patient reports me compliant taking medications without untoward side effects the. The patient is here to review his medical condition, update me on the medical condition and the patient reports me no hospitalizations and no ER visits. No injuries no illnesses overall he is doing well he does report to me he did not tolerate the Lexapro - heart racing 130 stopped after discontinuation of the Lexapro and there has not been any reoccurrence currently he reports me mild anxiety, dosent want treatment, less active ,  Stopped soda , no more fast food , eating more healthy.   He is here to review his laboratories he reports me he does not use the Xanax only very infrequently prefers not to use it. He does live at home he does have anxiety when leaving the home, his mom reports me is having a hard time moving onto the next step of his life  Patient Care Team:  Cari Devi DO as PCP - General     Review of Systems:     Review of Systems   Constitutional: Negative for activity change, appetite change and unexpected weight change. HENT: Negative for congestion. Eyes: Negative for visual disturbance. Respiratory: Negative for cough and shortness of breath. Cardiovascular: Negative for chest pain. Gastrointestinal: Negative for abdominal pain, diarrhea, nausea and vomiting. Neurological: Negative for dizziness, light-headedness and headaches. 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 No pertinent past medical history. History reviewed. No pertinent surgical history.    Family History:     Family History   Problem Relation Age of Onset   • No Known Problems Mother    • Ulcerative colitis Father    • No Known Problems Brother    • Crohn's disease Brother       Social History:     Social History     Socioeconomic History   • Marital status: Single     Spouse name: None   • Number of children: None   • Years of education: None   • Highest education level: None   Occupational History   • None   Tobacco Use   • Smoking status: Never   • Smokeless tobacco: Never   Vaping Use   • Vaping Use: Never used   Substance and Sexual Activity   • Alcohol use: Never   • Drug use: Never   • Sexual activity: Not Currently   Other Topics Concern   • None   Social History Narrative   • None     Social Determinants of Health     Financial Resource Strain: Low Risk  (8/25/2023)    Overall Financial Resource Strain (CARDIA)    • Difficulty of Paying Living Expenses: Not hard at all   Food Insecurity: Not on file   Transportation Needs: No Transportation Needs (8/25/2023)    PRAPARE - Transportation    • Lack of Transportation (Medical): No    • Lack of Transportation (Non-Medical): No   Physical Activity: Not on file   Stress: Not on file   Social Connections: Not on file   Intimate Partner Violence: Not on file   Housing Stability: Not on file      Medications and Allergies:     Current Outpatient Medications   Medication Sig Dispense Refill   • ALPRAZolam (XANAX) 0.25 mg tablet Take 1 tablet (0.25 mg total) by mouth daily as needed for anxiety 8 tablet 0   • Cholecalciferol (Vitamin D3) 125 MCG (5000 UT) TABS Take 5,000 Units by mouth daily     • Cholecalciferol 50 MCG (2000 UT) CAPS Take 1 capsule by mouth daily     • finasteride (PROPECIA) 1 MG tablet 0.5 tab daily     • Multiple Vitamin (Multi-Vitamin Daily) TABS Take 1 tablet by mouth daily       No current facility-administered medications for this visit. Allergies   Allergen Reactions   • Amoxicillin-Pot Clavulanate Vomiting      Immunizations: There is no immunization history on file for this patient. Health Maintenance:         Topic Date Due   • HIV Screening  11/03/2024 (Originally 4/8/2008)   • Hepatitis C Screening  12/03/2024 (Originally 1995)         Topic Date Due   • COVID-19 Vaccine (1) Never done   • Influenza Vaccine (1) 09/01/2023      Medicare Screening Tests and Risk Assessments:     Aldo Castillo is here for his Subsequent Wellness visit. Health Risk Assessment:   Patient rates overall health as good. Patient feels that their physical health rating is slightly better. Patient is satisfied with their life. Eyesight was rated as same. Hearing was rated as same.  Patient feels that their emotional and mental health rating is same. Patients states they are never, rarely angry. Patient states they are never, rarely unusually tired/fatigued. Pain experienced in the last 7 days has been none. Patient states that he has experienced weight loss or gain in last 6 months. Fall Risk Screening: In the past year, patient has experienced: no history of falling in past year      Home Safety:  Patient does not have trouble with stairs inside or outside of their home. Patient has working smoke alarms and has working carbon monoxide detector. Home safety hazards include: none. Nutrition:   Current diet is Regular, No Added Salt, Limited junk food and Low Cholesterol. Medications:   Patient is currently taking over-the-counter supplements. OTC medications include: see medication list. Patient is able to manage medications. Activities of Daily Living (ADLs)/Instrumental Activities of Daily Living (IADLs):   Walk and transfer into and out of bed and chair?: Yes  Dress and groom yourself?: Yes    Bathe or shower yourself?: Yes    Feed yourself?  Yes  Do your laundry/housekeeping?: Yes  Manage your money, pay your bills and track your expenses?: Yes  Make your own meals?: Yes    Do your own shopping?: Yes    Previous Hospitalizations:   Any hospitalizations or ED visits within the last 12 months?: No      Advance Care Planning:   Living will: No    Durable POA for healthcare: No    Advanced directive: No      PREVENTIVE SCREENINGS      Cardiovascular Screening:    General: Screening Current      Diabetes Screening:     General: Screening Current      Prostate Cancer Screening:    General: Screening Not Indicated      Lung Cancer Screening:     General: Screening Not Indicated      Hepatitis C Screening:    General: Screening Current    Screening, Brief Intervention, and Referral to Treatment (SBIRT)    Screening  Typical number of drinks in a day: 0  Typical number of drinks in a week: 0  Interpretation: Low risk drinking behavior. Single Item Drug Screening:  How often have you used an illegal drug (including marijuana) or a prescription medication for non-medical reasons in the past year? never    Single Item Drug Screen Score: 0  Interpretation: Negative screen for possible drug use disorder    No results found. Physical Exam:     /74   Pulse 82   Resp 16   Ht 5' 7" (1.702 m)   Wt 92.7 kg (204 lb 6.4 oz)   SpO2 98%   BMI 32.01 kg/m²     Physical Exam  Vitals and nursing note reviewed. Constitutional:       General: He is not in acute distress. Appearance: Normal appearance. He is well-developed. He is obese. He is not ill-appearing, toxic-appearing or diaphoretic. HENT:      Head: Normocephalic and atraumatic. Right Ear: External ear normal.      Left Ear: External ear normal.      Nose: Nose normal.   Eyes:      Pupils: Pupils are equal, round, and reactive to light. Cardiovascular:      Rate and Rhythm: Normal rate and regular rhythm. Heart sounds: Normal heart sounds. No murmur heard. Pulmonary:      Effort: Pulmonary effort is normal.      Breath sounds: Normal breath sounds. Abdominal:      General: There is no distension. Palpations: Abdomen is soft. Tenderness: There is no abdominal tenderness. There is no guarding. Neurological:      Mental Status: He is alert. Psychiatric:         Mood and Affect: Mood is anxious. Mood is not depressed. Thought Content: Thought content does not include suicidal ideation.           Pham Rubin,

## 2023-08-27 PROBLEM — L65.9 HAIR LOSS: Status: ACTIVE | Noted: 2023-08-27

## 2023-08-27 PROBLEM — E66.09 CLASS 1 OBESITY DUE TO EXCESS CALORIES WITH BODY MASS INDEX (BMI) OF 32.0 TO 32.9 IN ADULT: Status: ACTIVE | Noted: 2023-08-27

## 2023-08-27 PROBLEM — E55.9 VITAMIN D DEFICIENCY: Status: ACTIVE | Noted: 2023-08-27

## 2023-08-27 PROBLEM — E66.811 CLASS 1 OBESITY DUE TO EXCESS CALORIES WITH BODY MASS INDEX (BMI) OF 32.0 TO 32.9 IN ADULT: Status: ACTIVE | Noted: 2023-08-27

## 2023-08-27 NOTE — ASSESSMENT & PLAN NOTE
Assessment and plan 1. Medicare subsequent annual wellness examination overall the patient is clinically stable and doing well, we encouraged the patient to follow a healthy and balanced diet. We recommend that the patient exercise routinely approximately 30 minutes 5 times per week . We have reviewed the patient's vaccines and have made recommendations for updates if necessary annual flu shot in the fall recommend update of the tetanus booster he will let us know when ready, recommend COVID booster patient prefers not to receive this vaccine   . We will be ordering screening laboratories which are age appropriate. Return to the office in 3 to 4 months    call if any problems.

## 2023-08-27 NOTE — ASSESSMENT & PLAN NOTE
Significant anxiety possible agoraphobia currently not on medications only as needed Xanax 0.25 mg 1 tablet once a day as needed he reports me using very infrequently try not to use this medication I did explain to him that counseling would be best for this condition he will consider it and let me know in the future if interested I would like him to set up his follow-up visit on a day that counselor Niall Hay is in the office so I can make an introduction for him he has agreed to this

## 2023-08-28 ENCOUNTER — LAB (OUTPATIENT)
Dept: LAB | Facility: AMBULARY SURGERY CENTER | Age: 28
End: 2023-08-28
Payer: MEDICARE

## 2023-08-28 DIAGNOSIS — E55.9 VITAMIN D DEFICIENCY: ICD-10-CM

## 2023-08-28 DIAGNOSIS — E87.6 LOW BLOOD POTASSIUM: ICD-10-CM

## 2023-08-28 DIAGNOSIS — Z13.29 SCREENING FOR THYROID DISORDER: ICD-10-CM

## 2023-08-28 DIAGNOSIS — Z13.6 SCREENING FOR CARDIOVASCULAR CONDITION: ICD-10-CM

## 2023-08-28 LAB
25(OH)D3 SERPL-MCNC: 40.1 NG/ML (ref 30–100)
ALBUMIN SERPL BCP-MCNC: 4.5 G/DL (ref 3.5–5)
ALP SERPL-CCNC: 44 U/L (ref 34–104)
ALT SERPL W P-5'-P-CCNC: 23 U/L (ref 7–52)
ANION GAP SERPL CALCULATED.3IONS-SCNC: 12 MMOL/L
AST SERPL W P-5'-P-CCNC: 20 U/L (ref 13–39)
BILIRUB SERPL-MCNC: 0.56 MG/DL (ref 0.2–1)
BUN SERPL-MCNC: 12 MG/DL (ref 5–25)
CALCIUM SERPL-MCNC: 9.5 MG/DL (ref 8.4–10.2)
CHLORIDE SERPL-SCNC: 101 MMOL/L (ref 96–108)
CHOLEST SERPL-MCNC: 116 MG/DL
CO2 SERPL-SCNC: 25 MMOL/L (ref 21–32)
CREAT SERPL-MCNC: 1.03 MG/DL (ref 0.6–1.3)
GFR SERPL CREATININE-BSD FRML MDRD: 97 ML/MIN/1.73SQ M
GLUCOSE P FAST SERPL-MCNC: 91 MG/DL (ref 65–99)
HDLC SERPL-MCNC: 41 MG/DL
LDLC SERPL CALC-MCNC: 61 MG/DL (ref 0–100)
POTASSIUM SERPL-SCNC: 3.7 MMOL/L (ref 3.5–5.3)
PROT SERPL-MCNC: 7.1 G/DL (ref 6.4–8.4)
SODIUM SERPL-SCNC: 138 MMOL/L (ref 135–147)
TRIGL SERPL-MCNC: 70 MG/DL
TSH SERPL DL<=0.05 MIU/L-ACNC: 1.38 UIU/ML (ref 0.45–4.5)

## 2023-08-28 PROCEDURE — 80053 COMPREHEN METABOLIC PANEL: CPT

## 2023-08-28 PROCEDURE — 82306 VITAMIN D 25 HYDROXY: CPT

## 2023-08-28 PROCEDURE — 36415 COLL VENOUS BLD VENIPUNCTURE: CPT

## 2023-08-28 PROCEDURE — 84443 ASSAY THYROID STIM HORMONE: CPT

## 2023-08-28 PROCEDURE — 80061 LIPID PANEL: CPT

## 2023-10-24 PROBLEM — Z00.00 MEDICARE ANNUAL WELLNESS VISIT, SUBSEQUENT: Status: RESOLVED | Noted: 2023-08-25 | Resolved: 2023-10-24

## 2023-12-21 ENCOUNTER — RA CDI HCC (OUTPATIENT)
Dept: OTHER | Facility: HOSPITAL | Age: 28
End: 2023-12-21

## 2024-01-04 ENCOUNTER — RA CDI HCC (OUTPATIENT)
Dept: OTHER | Facility: HOSPITAL | Age: 29
End: 2024-01-04

## 2024-01-10 DIAGNOSIS — G47.10 HYPERSOMNOLENCE: Primary | ICD-10-CM

## 2024-02-20 PROBLEM — E04.1 THYROID NODULE: Status: ACTIVE | Noted: 2024-02-20

## 2024-02-21 ENCOUNTER — APPOINTMENT (OUTPATIENT)
Dept: LAB | Facility: AMBULARY SURGERY CENTER | Age: 29
End: 2024-02-21
Payer: MEDICARE

## 2024-02-21 ENCOUNTER — TELEPHONE (OUTPATIENT)
Age: 29
End: 2024-02-21

## 2024-02-21 ENCOUNTER — HOSPITAL ENCOUNTER (OUTPATIENT)
Dept: ULTRASOUND IMAGING | Facility: HOSPITAL | Age: 29
Discharge: HOME | End: 2024-02-21
Payer: MEDICARE

## 2024-02-21 DIAGNOSIS — Z13.6 SCREENING FOR CARDIOVASCULAR CONDITION: ICD-10-CM

## 2024-02-21 DIAGNOSIS — L65.9 HAIR LOSS: ICD-10-CM

## 2024-02-21 DIAGNOSIS — R79.9 ABNORMAL FINDING OF BLOOD CHEMISTRY, UNSPECIFIED: ICD-10-CM

## 2024-02-21 DIAGNOSIS — E04.1 THYROID NODULE: ICD-10-CM

## 2024-02-21 DIAGNOSIS — Z13.1 SCREENING FOR DIABETES MELLITUS: ICD-10-CM

## 2024-02-21 PROBLEM — R50.9 FEVER: Status: RESOLVED | Noted: 2020-11-10 | Resolved: 2024-02-21

## 2024-02-21 LAB
BASOPHILS # BLD AUTO: 0.04 THOUSANDS/ÂΜL (ref 0–0.1)
BASOPHILS NFR BLD AUTO: 0 % (ref 0–1)
EOSINOPHIL # BLD AUTO: 0.03 THOUSAND/ÂΜL (ref 0–0.61)
EOSINOPHIL NFR BLD AUTO: 0 % (ref 0–6)
ERYTHROCYTE [DISTWIDTH] IN BLOOD BY AUTOMATED COUNT: 12.6 % (ref 11.6–15.1)
EST. AVERAGE GLUCOSE BLD GHB EST-MCNC: 100 MG/DL
HBA1C MFR BLD: 5.1 %
HCT VFR BLD AUTO: 44.2 % (ref 36.5–49.3)
HGB BLD-MCNC: 14.5 G/DL (ref 12–17)
IMM GRANULOCYTES # BLD AUTO: 0.03 THOUSAND/UL (ref 0–0.2)
IMM GRANULOCYTES NFR BLD AUTO: 0 % (ref 0–2)
LYMPHOCYTES # BLD AUTO: 2.3 THOUSANDS/ÂΜL (ref 0.6–4.47)
LYMPHOCYTES NFR BLD AUTO: 25 % (ref 14–44)
MCH RBC QN AUTO: 29.1 PG (ref 26.8–34.3)
MCHC RBC AUTO-ENTMCNC: 32.8 G/DL (ref 31.4–37.4)
MCV RBC AUTO: 89 FL (ref 82–98)
MONOCYTES # BLD AUTO: 0.75 THOUSAND/ÂΜL (ref 0.17–1.22)
MONOCYTES NFR BLD AUTO: 8 % (ref 4–12)
NEUTROPHILS # BLD AUTO: 6.14 THOUSANDS/ÂΜL (ref 1.85–7.62)
NEUTS SEG NFR BLD AUTO: 67 % (ref 43–75)
NRBC BLD AUTO-RTO: 0 /100 WBCS
PLATELET # BLD AUTO: 295 THOUSANDS/UL (ref 149–390)
PMV BLD AUTO: 11.4 FL (ref 8.9–12.7)
RBC # BLD AUTO: 4.98 MILLION/UL (ref 3.88–5.62)
TSH SERPL DL<=0.05 MIU/L-ACNC: 0.97 UIU/ML (ref 0.45–4.5)
WBC # BLD AUTO: 9.29 THOUSAND/UL (ref 4.31–10.16)

## 2024-02-21 PROCEDURE — 36415 COLL VENOUS BLD VENIPUNCTURE: CPT

## 2024-02-21 PROCEDURE — 85025 COMPLETE CBC W/AUTO DIFF WBC: CPT

## 2024-02-21 PROCEDURE — 76536 US EXAM OF HEAD AND NECK: CPT

## 2024-02-21 PROCEDURE — 84443 ASSAY THYROID STIM HORMONE: CPT

## 2024-02-21 PROCEDURE — 80053 COMPREHEN METABOLIC PANEL: CPT

## 2024-02-21 PROCEDURE — 83036 HEMOGLOBIN GLYCOSYLATED A1C: CPT

## 2024-02-21 PROCEDURE — 80061 LIPID PANEL: CPT

## 2024-02-21 NOTE — TELEPHONE ENCOUNTER
Patients mother inquiring about genetic testing that was discussed during patients appointment yesterday with Dr. Pulido.  Mother stated that patient would like to go forward with this.    I was not sure if this was a nurse visit or not so reached out and  Spoke with Natali at office.  She asked Adelita Pulido's MA who will look into this and call mom to set up testing.    Forwarding to clinical.  Please call mom Marilia.  Thank you!

## 2024-02-22 LAB
ALBUMIN SERPL BCP-MCNC: 4.6 G/DL (ref 3.5–5)
ALP SERPL-CCNC: 48 U/L (ref 34–104)
ALT SERPL W P-5'-P-CCNC: 12 U/L (ref 7–52)
ANION GAP SERPL CALCULATED.3IONS-SCNC: 13 MMOL/L
AST SERPL W P-5'-P-CCNC: 16 U/L (ref 13–39)
BILIRUB SERPL-MCNC: 0.8 MG/DL (ref 0.2–1)
BUN SERPL-MCNC: 10 MG/DL (ref 5–25)
CALCIUM SERPL-MCNC: 9.7 MG/DL (ref 8.4–10.2)
CHLORIDE SERPL-SCNC: 98 MMOL/L (ref 96–108)
CHOLEST SERPL-MCNC: 135 MG/DL
CO2 SERPL-SCNC: 26 MMOL/L (ref 21–32)
CREAT SERPL-MCNC: 0.8 MG/DL (ref 0.6–1.3)
GFR SERPL CREATININE-BSD FRML MDRD: 119 ML/MIN/1.73SQ M
GLUCOSE P FAST SERPL-MCNC: 58 MG/DL (ref 65–99)
HDLC SERPL-MCNC: 58 MG/DL
LDLC SERPL CALC-MCNC: 68 MG/DL (ref 0–100)
POTASSIUM SERPL-SCNC: 3.6 MMOL/L (ref 3.5–5.3)
PROT SERPL-MCNC: 7.2 G/DL (ref 6.4–8.4)
SODIUM SERPL-SCNC: 137 MMOL/L (ref 135–147)
TRIGL SERPL-MCNC: 43 MG/DL

## 2024-02-26 ENCOUNTER — CLINICAL SUPPORT (OUTPATIENT)
Dept: INTERNAL MEDICINE CLINIC | Facility: CLINIC | Age: 29
End: 2024-02-26

## 2024-02-26 DIAGNOSIS — F41.9 ANXIETY: Primary | ICD-10-CM

## 2024-02-26 NOTE — PROGRESS NOTES
Patient came in today for a [employer] swab testing for possible medication changes for anxiety

## 2024-03-11 ENCOUNTER — OFFICE VISIT (OUTPATIENT)
Dept: INTERNAL MEDICINE CLINIC | Facility: CLINIC | Age: 29
End: 2024-03-11
Payer: MEDICARE

## 2024-03-11 VITALS
OXYGEN SATURATION: 97 % | WEIGHT: 206.6 LBS | DIASTOLIC BLOOD PRESSURE: 80 MMHG | BODY MASS INDEX: 32.43 KG/M2 | HEIGHT: 67 IN | RESPIRATION RATE: 16 BRPM | HEART RATE: 68 BPM | SYSTOLIC BLOOD PRESSURE: 122 MMHG

## 2024-03-11 DIAGNOSIS — H93.13 BILATERAL TINNITUS: ICD-10-CM

## 2024-03-11 DIAGNOSIS — F41.9 ANXIETY: Primary | ICD-10-CM

## 2024-03-11 PROCEDURE — 99213 OFFICE O/P EST LOW 20 MIN: CPT

## 2024-03-11 PROCEDURE — G2211 COMPLEX E/M VISIT ADD ON: HCPCS

## 2024-03-11 NOTE — ASSESSMENT & PLAN NOTE
Patient has history of Anxiety. He has triggering symptoms of tinnitus which worsens his anxiety. He gets nervous when leaving the house.  He was interested in doing MRI to view his brain but refused when he learned that there were no open MRI. He is afraid of the MRI sound.   He underwent genesight to determine best medications for him since no improvement with prior medication.  Results showed that Pristiq, Fetzima and Viiibryd  He is currently taking melatonin 6 mg for sleep.   Patient comes into his mother's room at night for when he hears different sounds.   Patient is refusing to start new medication for anxiety   Patient feels as though he is sleeping better and will wait and see if it continues to improve  Patient denied suicidal ideation    Plan:  Follow-up in 3 month if no improvement in anxiety  If no improvement, may discuss starting medication

## 2024-03-11 NOTE — PROGRESS NOTES
Name: Danilo Jorgensen      : 1995      MRN: [id number]  Encounter Provider: Anna West MD  Encounter Date: 3/11/2024   [facility] [place]    Assessment & Plan     1. Anxiety  Assessment & Plan:  Patient has history of Anxiety. He has triggering symptoms of tinnitus which worsens his anxiety. He gets nervous when leaving the house.  He was interested in doing MRI to view his brain but refused when he learned that there were no open MRI. He is afraid of the MRI sound.   He underwent genesight to determine best medications for him since no improvement with prior medication.  Results showed that Pristiq, Fetzima and Viiibryd  He is currently taking melatonin 6 mg for sleep.   Patient comes into his mother's room at night for when he hears different sounds.   Patient is refusing to start new medication for anxiety   Patient feels as though he is sleeping better and will wait and see if it continues to improve  Patient denied suicidal ideation    Plan:  Follow-up in 3 month if no improvement in anxiety  If no improvement, may discuss starting medication      2. Bilateral tinnitus  Assessment & Plan:  Patient has a history of tinnitus. Reports hearing change in sounds. Patient has been taking melatonin to help with sleep and reports improvement in symptoms.   His tinnitus is currently triggering his anxiety but patient denied starting new medication  He reports that an brain MRI was ordered but he refused due to the sound of the machine.    Plan:  Follow-up in 3 month to evaluate symptoms             Subjective      Mr. Jorgensen is a 28 year old male with PMH of anxiety, tinnitus, and vitamin D deficiency who is here for a  follow-up on Genesight result for best medical treatment for his anxiety. Patient states that his anxiety has improved compared to prior visit.  He was started on melatonin to help with sleep and states that maybe it is helping with anxiety.  Patient reports  The patient is a 47y Female complaining of rash. "that his tinnitus triggers his anxiety.  Discussed with patient about the 3 medication that the gene study showed appropriate for his anxiety. He chose to hold off for now due to slow  improvement.  Patient reports taking Xanax occasionally for his anxiety.  He  denies suicidal ideation.         Review of Systems   Constitutional:  Negative for chills and fever.   HENT:  Negative for ear pain and sore throat.    Eyes:  Negative for pain and visual disturbance.   Respiratory:  Negative for cough and shortness of breath.    Cardiovascular:  Negative for chest pain and palpitations.   Gastrointestinal:  Negative for abdominal pain and vomiting.   Genitourinary:  Negative for dysuria and hematuria.   Musculoskeletal:  Negative for arthralgias and back pain.   Skin:  Negative for color change and rash.   Neurological:  Negative for seizures and syncope.   Psychiatric/Behavioral:  Negative for self-injury, sleep disturbance and suicidal ideas. The patient is nervous/anxious.    All other systems reviewed and are negative.      Current Outpatient Medications on File Prior to Visit   Medication Sig    ALPRAZolam (XANAX) 0.25 mg tablet Take 1 tablet (0.25 mg total) by mouth daily as needed for anxiety    busPIRone (BUSPAR) 5 mg tablet Take 1 tablet (5 mg total) by mouth 2 (two) times a day (Patient not taking: Reported on 2/29/2024)    Cholecalciferol (Vitamin D3) 125 MCG (5000 UT) TABS Take 5,000 Units by mouth daily    Cholecalciferol 50 MCG (2000 UT) CAPS Take 1 capsule by mouth daily (Patient not taking: Reported on 2/29/2024)    finasteride (PROPECIA) 1 MG tablet 0.5 tab daily    Multiple Vitamin (Multi-Vitamin Daily) TABS Take 1 tablet by mouth daily       Objective     /80   Pulse 68   Resp 16   Ht 5' 7\" (1.702 m)   Wt 93.7 kg (206 lb 9.6 oz)   SpO2 97%   BMI 32.36 kg/m²     Physical Exam  Constitutional:       Appearance: Normal appearance.   HENT:      Head: Normocephalic and atraumatic.      " Mouth/Throat:      Mouth: Mucous membranes are moist.   Cardiovascular:      Rate and Rhythm: Normal rate and regular rhythm.   Pulmonary:      Effort: Pulmonary effort is normal.      Breath sounds: Normal breath sounds.   Abdominal:      General: Abdomen is flat. Bowel sounds are normal.      Palpations: Abdomen is soft.   Skin:     General: Skin is warm and dry.   Neurological:      Mental Status: He is alert and oriented to person, place, and time.       Anna West MD

## 2024-03-11 NOTE — ASSESSMENT & PLAN NOTE
Patient has a history of tinnitus. Reports hearing change in sounds. Patient has been taking melatonin to help with sleep and reports improvement in symptoms.   His tinnitus is currently triggering his anxiety but patient denied starting new medication  He reports that an brain MRI was ordered but he refused due to the sound of the machine.    Plan:  Follow-up in 3 month to evaluate symptoms

## 2024-03-18 ENCOUNTER — TELEPHONE (OUTPATIENT)
Age: 29
End: 2024-03-18

## 2024-03-18 ENCOUNTER — TELEPHONE (OUTPATIENT)
Dept: PSYCHIATRY | Facility: CLINIC | Age: 29
End: 2024-03-18

## 2024-03-18 NOTE — TELEPHONE ENCOUNTER
Reached out to pt in regards to routine referral. Pt mother answered the phone and stated pt was not available to speak. Provided Intake number for pt to call back.     Referral closed.

## 2024-03-18 NOTE — TELEPHONE ENCOUNTER
[facility] called and requesting to speak to intake in regards to appointment

## 2024-03-20 ENCOUNTER — EVALUATION (OUTPATIENT)
Dept: PHYSICAL THERAPY | Facility: CLINIC | Age: 29
End: 2024-03-20
Payer: MEDICARE

## 2024-03-20 DIAGNOSIS — M26.609 JOINT DISEASE, TEMPOROMANDIBULAR: ICD-10-CM

## 2024-03-20 DIAGNOSIS — M79.18 DIFFUSE MYOFASCIAL PAIN SYNDROME: ICD-10-CM

## 2024-03-20 DIAGNOSIS — H93.11 RIGHT-SIDED TINNITUS: Primary | ICD-10-CM

## 2024-03-20 PROCEDURE — 97110 THERAPEUTIC EXERCISES: CPT | Performed by: PHYSICAL THERAPIST

## 2024-03-20 PROCEDURE — 97161 PT EVAL LOW COMPLEX 20 MIN: CPT | Performed by: PHYSICAL THERAPIST

## 2024-03-20 NOTE — PROGRESS NOTES
PT Evaluation    Today's date: 3/20/2024  Patient name: Danilo Jorgensen  : 1995  MRN: [id number]  Referring provider: Reshma Ramos PA-C  Dx:   Encounter Diagnosis     ICD-10-CM    1. Right-sided tinnitus  H93.11       2. Diffuse myofascial pain syndrome  M79.18       3. Joint disease, temporomandibular  M26.609               Subjective Evaluation     History of Present Illness    Patient presents with c/o fullness in R ear and around this area and clicking in his L ear. He gets fullness in the ear since 2023. Then a few weeks ago it turned into tinnitus. He does get some HA. Recently he does get burning sensation in the R ear temporal region, jaw and R lateral arm.  He states the fullness sensation is unnerving. He tries to pop his jaw at times which will help his pain as this happened a few days ago. He does get clicking in the jaw when opening and he discerns it is more audible in the L ear.  His stress and anxiety is what brings on the discomfort the most. He has been walking lately which has been helping his anxiety.    He does have a long hx of depression and anxiety which he states is from genetics  He also noted he has had a failed R crown on his upper molar from a few years back and does not chew on this side of his mouth due to this. He has not had it evaluated by a dentist in a while. He was told he could have it extracted or get a new crown.        Neuro signs: He denies dizziness, nausea, diplopia, dysphagia, numbness, nystagmus   Red flags: none  Occupation: [employer]- until last year       Pain  At best pain ratin  At worst pain ratin  Location: all around R ear  Quality: fullness/ache/ click grind  Relieving factors: move jaw to clear it  Aggravating factors: stress, anxiety,     Social Support  Lives at home c his parents      Patient Goals  Patient goals for therapy: to relieve the fullness and click/grind    STGs  1. Decrease fullness sensation by 20% in  2-4 weeks.  2. Improve jaw opening s clicking by 50% in 2-4 weeks.   3. Improve postural awareness by gaining 30% C AROM  in 2-4 weeks.  4. Perform HEP 1x/day to improve grinding sensation in 4 weeks.       LTGs  1. Decrease fullness sensation by 60% in 6-8 weeks.  2. Improve jaw opening s clicking by 100% in 6-8 weeks.   3. Improve postural awareness by gaining full C-AROM in 6-8 weeks.    4. Perform HEP 2x/day so 50% less pain is felt to palpation of L facial muscles in 8 weeks.     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 molar  Tongue WFL  C vs S curve: C curve to L observed in supine  Opening L click   But C Tongue on roof no click    Sensation:WFL  CN screen:-    Lateral bite test: L painful on L side  Palpation: posterior TMJ TTP  Intraoral lateral pterygoid ttp, temporalis mm ttp L  Mastoid process ttp, mid scalene TTP        Cervical ROM L R Strength L R   Flex   WFL   Mid trap     Ext  50% clemente stretch better   Low trap     Rotation   30% clemente 20% clemente      Shoulder A/PROM WFL WFL      Flex  /  / Flex     Abd  /  / Abd     ER  /  / ER 5 5   IR scratch   IR 5 5   ER scratch                TMJ ROM        opening 50       closing        Lateral trusion 12 12            Assessment:    Danilo Jorgensen is a pleasant 28 y.o. male who presents with primary movement impairment diagnosis of L jaw hypermobility and decreased motor coordination resulting in pathoanatomical symptoms of L jaw clicking and R ear fullness. He also has postural deficits, lacking one tooth which affects his function, decreased cervical/thoracic ROM, and myofascial tightness in facial and cervical musculature. This is limiting  his ability to open his jaw s clicking.   The patient's greatest concern is improving his fullness in the ear.  No further referral appears necessary at this time based upon examination results.        Etiologic factors include not chewing on R teeth due to dental  procedure where he no longer has R upper molar and causing all of the chewing to be done on the L side.    Negative prognostic indicators:stress and  financial burden of dental work. Positive prognostic indicators: age and good understanding. Please contact me if you have any further questions or recommendations. Thank you very much for the kind referral.        Plan  Patient would benefit from:Skilled physical therapy  Planned therapy interventions: manual therapy, neuromuscular re-education, stretching, strengthening, therapeutic activities, therapeutic exercise, patient education, home exercise program, and activity modification.    Frequency: 2x week  Duration in weeks: 8  Treatment plan discussed with: patient             Goals: Patient's goal is to decrease fullness in the ear    Precautions: anxiety  Dx:L jaw hypermobility- decreased motor control       Daily Treatment Diary       Manuals 3/20/2024        STM to L lateral pterygoid temporalis, and L scalenes Self massage instruction                                             Neuro re-ed         Tongue on roof isometric 10x        Tongue on roof c opening 10x         Chin retraction 10x        GTB row/LPD         Thoracic ext over chair 10x :10        Tongue on roof c DNF         C-extension  10x        Therapeutic exercise         L Scalene st 3x :20        Self recapturing exercise         Lateral trusion AROM         Opening isometric                                                                                          Modalities         MHP L side of face

## 2024-03-25 ENCOUNTER — OFFICE VISIT (OUTPATIENT)
Dept: PHYSICAL THERAPY | Facility: CLINIC | Age: 29
End: 2024-03-25
Payer: MEDICARE

## 2024-03-25 DIAGNOSIS — H93.11 RIGHT-SIDED TINNITUS: Primary | ICD-10-CM

## 2024-03-25 DIAGNOSIS — M79.18 DIFFUSE MYOFASCIAL PAIN SYNDROME: ICD-10-CM

## 2024-03-25 DIAGNOSIS — M26.609 JOINT DISEASE, TEMPOROMANDIBULAR: ICD-10-CM

## 2024-03-25 PROCEDURE — 97140 MANUAL THERAPY 1/> REGIONS: CPT | Performed by: PHYSICAL THERAPIST

## 2024-03-25 PROCEDURE — 97112 NEUROMUSCULAR REEDUCATION: CPT | Performed by: PHYSICAL THERAPIST

## 2024-03-25 NOTE — PROGRESS NOTES
Daily Note     Today's date: 3/25/2024  Patient name: Danilo Jorgensen  : 1995  MRN: [id number]  Referring provider: Reshma Ramos PA-C  Dx:   Encounter Diagnosis     ICD-10-CM    1. Right-sided tinnitus  H93.11       2. Diffuse myofascial pain syndrome  M79.18       3. Joint disease, temporomandibular  M26.609                      Subjective: He felt a slip on his R side and had pain c opening and closing then it took 2 days to improve.       Objective: See treatment diary below      Assessment: Tolerated treatment well although he did have some hesitancy c L lateral glide and click in R jaw.  Trialed MHP to L jaw but he preferred to have it on his R jaw so he was obliged. Will trial moist heat for L jaw nv. Patient exhibited good technique with therapeutic exercises      Plan: Continue per plan of care.      Goals: Patient's goal is to decrease fullness in the ear    Precautions: anxiety  Dx:L jaw hypermobility- decreased motor control   [id number]     Daily Treatment Diary       Manuals 3/20/2024 3/25       STM to L lateral pterygoid temporalis, and L scalenes Self massage instruction 8'                                            Neuro re-ed         Tongue on roof isometric 10x        Tongue on roof c opening 10x 10x        Chin retraction 10x 20x       GTB row/LPD         Thoracic ext over chair 10x :10 10x :10                C-extension  10x 10x       Therapeutic exercise         L Scalene st 3x :20 3x :20       Self recapturing exercise         Lateral trusion AROM tongue on roof   L 10x       Opening isometric                                                                                          Modalities         MHP L side of face  10'

## 2024-04-01 ENCOUNTER — OFFICE VISIT (OUTPATIENT)
Dept: PHYSICAL THERAPY | Facility: CLINIC | Age: 29
End: 2024-04-01
Payer: MEDICARE

## 2024-04-01 DIAGNOSIS — M79.18 DIFFUSE MYOFASCIAL PAIN SYNDROME: ICD-10-CM

## 2024-04-01 DIAGNOSIS — H93.11 RIGHT-SIDED TINNITUS: Primary | ICD-10-CM

## 2024-04-01 DIAGNOSIS — M26.609 JOINT DISEASE, TEMPOROMANDIBULAR: ICD-10-CM

## 2024-04-01 PROCEDURE — 97110 THERAPEUTIC EXERCISES: CPT | Performed by: PHYSICAL THERAPIST

## 2024-04-01 PROCEDURE — 97140 MANUAL THERAPY 1/> REGIONS: CPT | Performed by: PHYSICAL THERAPIST

## 2024-04-01 NOTE — PROGRESS NOTES
Daily Note     Today's date: 2024  Patient name: Danilo Jorgensen  : 1995  MRN: [id number]  Referring provider: Reshma Ramos PA-C  Dx:   Encounter Diagnosis     ICD-10-CM    1. Right-sided tinnitus  H93.11       2. Diffuse myofascial pain syndrome  M79.18       3. Joint disease, temporomandibular  M26.609                      Subjective: He felt a slip on his R side and had pain c opening and closing then it took 2 days to improve.       Objective: See treatment diary below      Assessment: Tolerated treatment well although he did have more pain in R jaw today as well as R scalenes. Will assess this at nv.  Patient exhibited good technique with therapeutic exercises      Plan: Continue per plan of care.      Goals: Patient's goal is to decrease fullness in the ear    Precautions: anxiety  Dx:L jaw hypermobility- decreased motor control   [id number]     Daily Treatment Diary       Manuals 3/20/2024 3/25 4/1      STM to L lateral pterygoid temporalis, and L scalenes Self massage instruction 8' 10' R scalenes today                                           Neuro re-ed         Tongue on roof isometric 10x        Tongue on roof c opening 10x 10x 10x       Chin retraction 10x 20x 20x      GTB row/LPD         Thoracic ext over chair 10x :10 10x :10 10x :10      C ext c op   10x      C-extension  10x 10x 10x      Therapeutic exercise         L Scalene st 3x :20 3x :20       Self recapturing exercise         Lateral trusion AROM tongue on roof   L 10x 10x      Opening isometric                                                                                          Modalities         MHP L side of face  10' R side 10' L side

## 2024-04-05 ENCOUNTER — OFFICE VISIT (OUTPATIENT)
Dept: PHYSICAL THERAPY | Facility: CLINIC | Age: 29
End: 2024-04-05
Payer: MEDICARE

## 2024-04-05 DIAGNOSIS — H93.11 RIGHT-SIDED TINNITUS: Primary | ICD-10-CM

## 2024-04-05 DIAGNOSIS — M26.609 JOINT DISEASE, TEMPOROMANDIBULAR: ICD-10-CM

## 2024-04-05 DIAGNOSIS — M79.18 DIFFUSE MYOFASCIAL PAIN SYNDROME: ICD-10-CM

## 2024-04-05 PROCEDURE — 97112 NEUROMUSCULAR REEDUCATION: CPT | Performed by: PHYSICAL THERAPIST

## 2024-04-05 PROCEDURE — 97110 THERAPEUTIC EXERCISES: CPT | Performed by: PHYSICAL THERAPIST

## 2024-04-05 PROCEDURE — 97140 MANUAL THERAPY 1/> REGIONS: CPT | Performed by: PHYSICAL THERAPIST

## 2024-04-05 NOTE — PROGRESS NOTES
Daily Note     Today's date: 2024  Patient name: Danilo Jorgensen  : 1995  MRN: [id number]  Referring provider: Reshma Ramos PA-C  Dx:   Encounter Diagnosis     ICD-10-CM    1. Right-sided tinnitus  H93.11       2. Diffuse myofascial pain syndrome  M79.18       3. Joint disease, temporomandibular  M26.609                      Subjective: He states the R side pain did clear up. He still does get clicking on the L jaw. He did have some dull pain on the R jaw.            Objective: See treatment diary below      Assessment: Tolerated treatment well and he did have more tightness in R scalenes so stretch was added for this. Suboccipitals in L side were very sore to palpation so added in more of a stretch. Patient exhibited good technique with therapeutic exercises      Plan: Continue per plan of care.      Goals: Patient's goal is to decrease fullness in the ear    Precautions: anxiety  Dx:L jaw hypermobility- decreased motor control   [id number]     Daily Treatment Diary       Manuals 3/20/2024 3/25 4/1 4     STM to L lateral pterygoid temporalis, and L scalenes Self massage instruction 8' 10' R scalenes today 10'                                           Neuro re-ed         Tongue on roof isometric 10x        Tongue on roof c opening 10x 10x 10x 10x      Chin retraction 10x 20x 20x 20x     GTB row/LPD         Thoracic ext over chair 10x :10 10x :10 10x :10 10x :10     C ext c op   10x 10x     C-extension  10x 10x 10x 10x     Therapeutic exercise         L Scalene st 3x :20 3x :20  3x :20 performed R side today     Self recapturing exercise   10x      Lateral trusion AROM tongue on roof   L 10x 10x 10x     Opening isometric         Subocciptal st    5x :20                                                                             Modalities         MHP L side of face  10' R side 10' L side declined

## 2024-04-08 ENCOUNTER — OFFICE VISIT (OUTPATIENT)
Dept: PHYSICAL THERAPY | Facility: CLINIC | Age: 29
End: 2024-04-08
Payer: MEDICARE

## 2024-04-08 DIAGNOSIS — M79.18 DIFFUSE MYOFASCIAL PAIN SYNDROME: ICD-10-CM

## 2024-04-08 DIAGNOSIS — M26.609 JOINT DISEASE, TEMPOROMANDIBULAR: ICD-10-CM

## 2024-04-08 DIAGNOSIS — H93.11 RIGHT-SIDED TINNITUS: Primary | ICD-10-CM

## 2024-04-08 PROCEDURE — 97140 MANUAL THERAPY 1/> REGIONS: CPT | Performed by: PHYSICAL THERAPIST

## 2024-04-08 PROCEDURE — 97110 THERAPEUTIC EXERCISES: CPT | Performed by: PHYSICAL THERAPIST

## 2024-04-08 NOTE — PROGRESS NOTES
Daily Note     Today's date: 2024  Patient name: Danilo Jorgensen  : 1995  MRN: [id number]  Referring provider: Reshma Ramos PA-C  Dx:   Encounter Diagnosis     ICD-10-CM    1. Right-sided tinnitus  H93.11       2. Joint disease, temporomandibular  M26.609       3. Diffuse myofascial pain syndrome  M79.18                      Subjective: He states the R jaw pain he had was less after last visit. He did get more HA for 3 hours after doing suboccipital st/massage. Then after this it went back to normal.            Objective: See treatment diary below      Assessment: Tolerated treatment well. Trialed DNF c lift but it did cause his R sided neck pain. Will continue to progress as tolerable.  Patient exhibited good technique with therapeutic exercises      Plan: Continue per plan of care.      Goals: Patient's goal is to decrease fullness in the ear    Precautions: anxiety  Dx:L jaw hypermobility- decreased motor control   [id number]     Daily Treatment Diary       Manuals 3/20/2024 3/25 4/1 4/5 4/8    STM to L lateral pterygoid temporalis, and L scalenes Self massage instruction 8' 10' R scalenes today 10'  10    R lateral pterygoid stm, and R scalene stm                                     Neuro re-ed         DNF c lift     10x painful trial TG nv    Tongue on roof c opening 10x 10x 10x 10x      Chin retraction 10x 20x 20x 20x 20x    GTB row/LPD         Thoracic ext over chair 10x :10 10x :10 10x :10 10x :10     C ext c op   10x 10x 10x :05    C-extension  10x 10x 10x 10x     Therapeutic exercise         L Scalene st 3x :20 3x :20  3x :20 performed R side today R 3x :20    Self recapturing exercise   10x      Lateral trusion AROM tongue on roof   L 10x 10x 10x 10x    Opening isometric         Subocciptal st    5x :20 nv                                                                            Modalities         MHP L side of face  10' R side 10' L side declined

## 2024-04-11 ENCOUNTER — OFFICE VISIT (OUTPATIENT)
Dept: PHYSICAL THERAPY | Facility: CLINIC | Age: 29
End: 2024-04-11
Payer: MEDICARE

## 2024-04-11 DIAGNOSIS — H93.11 RIGHT-SIDED TINNITUS: Primary | ICD-10-CM

## 2024-04-11 DIAGNOSIS — M79.18 DIFFUSE MYOFASCIAL PAIN SYNDROME: ICD-10-CM

## 2024-04-11 DIAGNOSIS — M26.609 JOINT DISEASE, TEMPOROMANDIBULAR: ICD-10-CM

## 2024-04-11 PROCEDURE — 97110 THERAPEUTIC EXERCISES: CPT | Performed by: PHYSICAL THERAPIST

## 2024-04-11 PROCEDURE — 97140 MANUAL THERAPY 1/> REGIONS: CPT | Performed by: PHYSICAL THERAPIST

## 2024-04-11 NOTE — PROGRESS NOTES
Daily Note     Today's date: 2024  Patient name: Danilo Jorgensen  : 1995  MRN: [id number]  Referring provider: Reshma Ramos PA-C  Dx:   Encounter Diagnosis     ICD-10-CM    1. Right-sided tinnitus  H93.11       2. Joint disease, temporomandibular  M26.609       3. Diffuse myofascial pain syndrome  M79.18                      Subjective: He states he was feeling pretty good since last session and continues to feel very well. He does not feel L nor R jaw pain. He did notice some R fullness in the head after eating.            Objective: See treatment diary below      Assessment: Tolerated treatment well. DNF was a bit sore on TG. His neck ROM is doing well. Continue c current stabilization program.  Patient exhibited good technique with therapeutic exercises      Plan: Continue per plan of care.      Goals: Patient's goal is to decrease fullness in the ear    Precautions: anxiety  Dx:L jaw hypermobility- decreased motor control   [id number]     Daily Treatment Diary       Manuals 3/20/2024 3/25 4/1 4/5 4/8 4/11   STM to L lateral pterygoid temporalis, and L scalenes Self massage instruction 8' 10' R scalenes today 10'      R lateral pterygoid stm, and R scalene stm     10' 10'                               Neuro re-ed         DNF c lift     10x painful trial TG nv 2x7 :05   Tongue on roof c opening 10x 10x 10x 10x      Chin retraction 10x 20x 20x 20x 20x 20x   GTB row/LPD         Thoracic ext over chair 10x :10 10x :10 10x :10 10x :10     C ext c op   10x 10x 10x :05 10x :05   C-extension  c scap retraction 10x 10x 10x 10x  10x    Therapeutic exercise         L Scalene st 3x :20 3x :20  3x :20 performed R side today R 3x :20 3x :20 R   Self recapturing exercise   10x      Lateral trusion AROM tongue on roof   L 10x 10x 10x 10x 10x   Opening isometric         Subocciptal st    5x :20 nv held                                                                           Modalities         MHP L side of face  10' R  side 10' L side declined

## 2024-04-12 ENCOUNTER — HOSPITAL ENCOUNTER (OUTPATIENT)
Dept: MRI IMAGING | Facility: HOSPITAL | Age: 29
Discharge: HOME | End: 2024-04-12
Payer: MEDICARE

## 2024-04-12 DIAGNOSIS — H93.11 TINNITUS, RIGHT: ICD-10-CM

## 2024-04-12 PROCEDURE — A9585 GADOBUTROL INJECTION: HCPCS | Performed by: PHYSICIAN ASSISTANT

## 2024-04-12 PROCEDURE — 70553 MRI BRAIN STEM W/O & W/DYE: CPT

## 2024-04-12 RX ORDER — GADOBUTROL 604.72 MG/ML
9 INJECTION INTRAVENOUS
Status: COMPLETED | OUTPATIENT
Start: 2024-04-12 | End: 2024-04-12

## 2024-04-12 RX ADMIN — GADOBUTROL 9 ML: 604.72 INJECTION INTRAVENOUS at 19:36

## 2024-04-15 ENCOUNTER — OFFICE VISIT (OUTPATIENT)
Dept: PHYSICAL THERAPY | Facility: CLINIC | Age: 29
End: 2024-04-15
Payer: MEDICARE

## 2024-04-15 DIAGNOSIS — M26.609 JOINT DISEASE, TEMPOROMANDIBULAR: ICD-10-CM

## 2024-04-15 DIAGNOSIS — H93.11 RIGHT-SIDED TINNITUS: Primary | ICD-10-CM

## 2024-04-15 DIAGNOSIS — M79.18 DIFFUSE MYOFASCIAL PAIN SYNDROME: ICD-10-CM

## 2024-04-15 PROCEDURE — 97110 THERAPEUTIC EXERCISES: CPT | Performed by: PHYSICAL THERAPIST

## 2024-04-15 PROCEDURE — 97112 NEUROMUSCULAR REEDUCATION: CPT | Performed by: PHYSICAL THERAPIST

## 2024-04-15 PROCEDURE — 97140 MANUAL THERAPY 1/> REGIONS: CPT | Performed by: PHYSICAL THERAPIST

## 2024-04-15 NOTE — PROGRESS NOTES
Daily Note     Today's date: 4/15/2024  Patient name: Danilo Jorgensen  : 1995  MRN: [id number]  Referring provider: Reshma Ramos PA-C  Dx:   Encounter Diagnosis     ICD-10-CM    1. Right-sided tinnitus  H93.11       2. Diffuse myofascial pain syndrome  M79.18       3. Joint disease, temporomandibular  M26.609                      Subjective: He states he is feeling pretty good and does not have pain after last visit. He has been wanting to get back into exercising and has not yet trialed gym program.         Objective: See treatment diary below      Assessment: Tolerated treatment well.  He was given updated HEP.  Patient exhibited good technique with therapeutic exercises      Plan: Continue per plan of care.      Goals: Patient's goal is to decrease fullness in the ear    Precautions: anxiety  Dx:L jaw hypermobility- decreased motor control   [id number]     Daily Treatment Diary       Manuals 4/15 3/25 4/1 4/5 4/8 4/11   STM to L lateral pterygoid temporalis, and L scalenes  8' 10' R scalenes today 10'      R lateral pterygoid stm, and R scalene stm 10'    10' 10'                               Neuro re-ed         DNF c lift on TG L 20 20x :03    10x painful trial TG nv 2x7 :05   GTB wall walk 7x         Chin retraction 10x 20x 20x 20x 20x 20x   GTB row/LPD 20x        Thoracic ext over chair 10x :10 10x :10 10x :10 10x :10     C ext c op   10x 10x 10x :05 10x :05   C-extension  c scap retraction 10x 10x 10x 10x  10x    Therapeutic exercise         L Scalene st 3x :20 3x :20  3x :20 performed R side today R 3x :20 3x :20 R   Self recapturing exercise   10x      Lateral trusion AROM tongue on roof   L 10x 10x 10x 10x 10x   Opening isometric         Subocciptal st    5x :20 nv held   Overhead press #5 20x        Tricep ext #5 2x10        Prone t/y 2x10                                                     Modalities         MHP L side of face  10' R side 10' L side declined

## 2024-04-18 ENCOUNTER — OFFICE VISIT (OUTPATIENT)
Dept: PHYSICAL THERAPY | Facility: CLINIC | Age: 29
End: 2024-04-18
Payer: MEDICARE

## 2024-04-18 DIAGNOSIS — H93.11 RIGHT-SIDED TINNITUS: Primary | ICD-10-CM

## 2024-04-18 DIAGNOSIS — M26.609 JOINT DISEASE, TEMPOROMANDIBULAR: ICD-10-CM

## 2024-04-18 DIAGNOSIS — M79.18 DIFFUSE MYOFASCIAL PAIN SYNDROME: ICD-10-CM

## 2024-04-18 PROCEDURE — 97110 THERAPEUTIC EXERCISES: CPT | Performed by: PHYSICAL THERAPIST

## 2024-04-18 PROCEDURE — 97112 NEUROMUSCULAR REEDUCATION: CPT | Performed by: PHYSICAL THERAPIST

## 2024-04-18 PROCEDURE — 97140 MANUAL THERAPY 1/> REGIONS: CPT | Performed by: PHYSICAL THERAPIST

## 2024-04-18 NOTE — PROGRESS NOTES
Daily Note     Today's date: 2024  Patient name: Danilo Jorgensen  : 1995  MRN: [id number]  Referring provider: Reshma Ramos PA-C  Dx:   Encounter Diagnosis     ICD-10-CM    1. Right-sided tinnitus  H93.11       2. Joint disease, temporomandibular  M26.609       3. Diffuse myofascial pain syndrome  M79.18                      Subjective: He states he is feeling pretty good and does not have pain after last visit. He has been wanting to get back into exercising and has not yet trialed gym program.         Objective: See treatment diary below      Assessment: Tolerated treatment well.  He will follow up in 2 weeks and see how HEP is going. Patient exhibited good technique with therapeutic exercises      Plan: Continue per plan of care.      Goals: Patient's goal is to decrease fullness in the ear    Precautions: anxiety  Dx:L jaw hypermobility- decreased motor control   [id number]     Daily Treatment Diary       Manuals 4/15 4/18 4/1 4/5 4/8 4/11   STM to L lateral pterygoid temporalis, and L scalenes   10' R scalenes today 10'      R lateral pterygoid stm, and R scalene stm 10' 8'   10' 10'                               Neuro re-ed         DNF c lift on TG L 20 20x :03 20x :03   10x painful trial TG nv 2x7 :05   GTB wall walk 7x 7x        Chin retraction 10x 20x 20x 20x 20x 20x   GTB row/LPD 20x 20x       Thoracic ext over chair 10x :10 10x :10 10x :10 10x :10     C ext c op   10x 10x 10x :05 10x :05   C-extension  c scap retraction 10x 10x 10x 10x  10x    Therapeutic exercise         L Scalene st 3x :20 3x :20  3x :20 performed R side today R 3x :20 3x :20 R   Self recapturing exercise   10x      Lateral trusion AROM tongue on roof   L 10x 10x 10x 10x 10x   Opening isometric         Subocciptal st    5x :20 nv held   Overhead press #5 20x 20x       Tricep ext #5 2x10 2x10       Prone t/y 2x10 2x10                                                    Modalities         MHP L side of face   10' L side declined

## 2024-06-06 ENCOUNTER — RA CDI HCC (OUTPATIENT)
Dept: OTHER | Facility: HOSPITAL | Age: 29
End: 2024-06-06

## 2024-06-06 PROBLEM — Z86.16 PERSONAL HISTORY OF COVID-19: Status: ACTIVE | Noted: 2020-11-13

## 2024-06-06 PROBLEM — Z86.16 PERSONAL HISTORY OF COVID-19: Status: ACTIVE | Noted: 2024-06-06

## 2024-06-10 RX ORDER — LORATADINE PSEUDOEPHEDRINE SULFATE 10; 240 MG/1; MG/1
1 TABLET, EXTENDED RELEASE ORAL DAILY
COMMUNITY
Start: 2024-05-31 | End: 2024-08-29

## 2024-06-10 RX ORDER — AMOXICILLIN AND CLAVULANATE POTASSIUM 875; 125 MG/1; MG/1
1 TABLET, FILM COATED ORAL 2 TIMES DAILY
COMMUNITY
Start: 2024-06-06 | End: 2024-06-13

## 2024-06-10 RX ORDER — FLUTICASONE PROPIONATE 50 MCG
2 SPRAY, SUSPENSION (ML) NASAL DAILY
COMMUNITY
Start: 2024-05-31

## 2024-06-10 RX ORDER — METHYLPREDNISOLONE 4 MG/1
TABLET ORAL
COMMUNITY
Start: 2024-06-06 | End: 2024-06-12

## 2024-06-12 ENCOUNTER — OFFICE VISIT (OUTPATIENT)
Dept: INTERNAL MEDICINE CLINIC | Facility: CLINIC | Age: 29
End: 2024-06-12
Payer: MEDICARE

## 2024-06-12 VITALS
HEART RATE: 72 BPM | WEIGHT: 204 LBS | BODY MASS INDEX: 32.02 KG/M2 | HEIGHT: 67 IN | DIASTOLIC BLOOD PRESSURE: 88 MMHG | OXYGEN SATURATION: 97 % | SYSTOLIC BLOOD PRESSURE: 120 MMHG

## 2024-06-12 DIAGNOSIS — Z13.6 SCREENING FOR CARDIOVASCULAR CONDITION: Primary | ICD-10-CM

## 2024-06-12 DIAGNOSIS — E66.09 CLASS 1 OBESITY DUE TO EXCESS CALORIES WITHOUT SERIOUS COMORBIDITY WITH BODY MASS INDEX (BMI) OF 32.0 TO 32.9 IN ADULT: ICD-10-CM

## 2024-06-12 DIAGNOSIS — E55.9 VITAMIN D DEFICIENCY: ICD-10-CM

## 2024-06-12 DIAGNOSIS — F41.9 ANXIETY: ICD-10-CM

## 2024-06-12 DIAGNOSIS — R09.81 SINUS CONGESTION: ICD-10-CM

## 2024-06-12 DIAGNOSIS — H93.13 BILATERAL TINNITUS: ICD-10-CM

## 2024-06-12 PROCEDURE — 99214 OFFICE O/P EST MOD 30 MIN: CPT | Performed by: INTERNAL MEDICINE

## 2024-06-12 PROCEDURE — G2211 COMPLEX E/M VISIT ADD ON: HCPCS | Performed by: INTERNAL MEDICINE

## 2024-06-12 NOTE — PROGRESS NOTES
Assessment/Plan:    Anxiety  Increased anxiety secondary to dynamics within the family related to his brother being very ill and moving back into the home the patient reporting increased symptoms of anxiety supportive counseling and listening provided today I have asked the patient to try to be more flexible and supportive to his brother as he is recovering from his illness patient has agreed also his father is ill and getting out of the hospital patient is accompanied with his mom today I did talk to him about possibly starting SSRI and I did review the [employer] testing that was completed previously to help improve the accuracy with medication treatment for his anxiety at this point in time the patient would like to hold off but he will let me know in the future if interested also I did recommend that he meet with counselor Yudy Garcia within our practice the patient seems to be interested in this but would like to think about this and let us know in the future if interested.  Today I did provide counseling for the patient for which she was very receptive and we will continue to talk to the patient to help support him and his family through this very stressful time.  I did ask the patient to consider starting to walk on a regular basis and he is very interested.    Class 1 obesity due to excess calories with body mass index (BMI) of 32.0 to 32.9 in adult  Obesity -I have counseled patient following healthy and balanced diet, I would like the patient to lose weight, I would like the patient exercise routinely; we will continue monitor the patient's progress.    Bilateral tinnitus  Ongoing symptoms patient has seen ENT no treatment recommended    Vitamin D deficiency  Continue vitamin D3 supplement 2000 IUs once daily    Sinus congestion  No sign or symptom of active infection he has completed amoxicillin his symptoms are improving he does report to me a mild headache at this point recommend Flonase 2 sprays  each nostril once daily, nasal saline lavage kit he has taken a steroid pack we will continue to observe and monitor if symptoms change or worsen he will notify me         Problem List Items Addressed This Visit        Respiratory    Sinus congestion     No sign or symptom of active infection he has completed amoxicillin his symptoms are improving he does report to me a mild headache at this point recommend Flonase 2 sprays each nostril once daily, nasal saline lavage kit he has taken a steroid pack we will continue to observe and monitor if symptoms change or worsen he will notify me            Behavioral Health    Anxiety     Increased anxiety secondary to dynamics within the family related to his brother being very ill and moving back into the home the patient reporting increased symptoms of anxiety supportive counseling and listening provided today I have asked the patient to try to be more flexible and supportive to his brother as he is recovering from his illness patient has agreed also his father is ill and getting out of the hospital patient is accompanied with his mom today I did talk to him about possibly starting SSRI and I did review the [employer] testing that was completed previously to help improve the accuracy with medication treatment for his anxiety at this point in time the patient would like to hold off but he will let me know in the future if interested also I did recommend that he meet with counselor Yudy Garcia within our practice the patient seems to be interested in this but would like to think about this and let us know in the future if interested.  Today I did provide counseling for the patient for which she was very receptive and we will continue to talk to the patient to help support him and his family through this very stressful time.  I did ask the patient to consider starting to walk on a regular basis and he is very interested.            Other    Bilateral tinnitus     Ongoing  symptoms patient has seen ENT no treatment recommended         Vitamin D deficiency     Continue vitamin D3 supplement 2000 IUs once daily         Class 1 obesity due to excess calories with body mass index (BMI) of 32.0 to 32.9 in adult     Obesity -I have counseled patient following healthy and balanced diet, I would like the patient to lose weight, I would like the patient exercise routinely; we will continue monitor the patient's progress.        Other Visit Diagnoses     Screening for cardiovascular condition    -  Primary    Relevant Orders    Basic metabolic panel          I have spent a total time of 30 minutes on 06/16/24 in caring for this patient including Instructions for management, Patient and family education, Impressions, Counseling / Coordination of care, Documenting in the medical record, Reviewing / ordering tests, medicine, procedures  , and Obtaining or reviewing history  .   Subjective:      Patient ID: Danilo Jorgensen is a 29 y.o. male.    HPI 29-year old male coming in for a follow up visit regarding anxiety, class I obesity, bilateral tinnitus, vitamin D deficiency and sinus congestion; the patient reports me compliant taking medications without untoward side effects the.  The patient is here to review his medical condition, update me on the medical condition and the patient reports me no hospitalizations and no ER visits.  Patient reported to me recently treated for an acute sinus infection patient reports great symptom improvement with residual mild sinus headache sinus pain and pressure a little congested ,  no colorful ,  some pressure  took amoxil , medrol  felt better with the initial dose accupuncture help, pt considering counselling   Patient does report to me ongoing stress at home his brother has been very ill in the hospital and now living back at home also father was in the hospital and recently discharged patient does report increased anxiety because of this no SI he does not want  medicine but may consider counseling in the future today I did provide counseling.  He does report to me ongoing tinnitus has seen ENT there is no treatment?  If anxiety may worsen the symptoms.  Slight numbness top of the head,     Stress and anxiety , home situation , worry about mom ,  no si no depression,     The following portions of the patient's history were reviewed and updated as appropriate: allergies, current medications, past family history, past medical history, past social history, past surgical history and problem list.    Review of Systems   Constitutional:  Negative for activity change, appetite change and unexpected weight change.   HENT:  Positive for congestion and sinus pressure. Negative for postnasal drip and sinus pain.    Eyes:  Negative for visual disturbance.   Respiratory:  Negative for cough and shortness of breath.    Cardiovascular:  Negative for chest pain.   Gastrointestinal:  Negative for abdominal pain, diarrhea, nausea and vomiting.   Neurological:  Negative for dizziness, light-headedness and headaches.   Hematological:  Negative for adenopathy.   Psychiatric/Behavioral:  Negative for suicidal ideas. The patient is nervous/anxious.          Objective:    Return in about 3 months (around 9/12/2024).    No results found.      Allergies   Allergen Reactions   • Amoxicillin-Pot Clavulanate Vomiting   • Lexapro [Escitalopram] Palpitations       Past Medical History:   Diagnosis Date   • Anxiety    • Depression      History reviewed. No pertinent surgical history.  Current Outpatient Medications on File Prior to Visit   Medication Sig Dispense Refill   • Cholecalciferol (Vitamin D3) 125 MCG (5000 UT) TABS Take 5,000 Units by mouth daily     • finasteride (PROPECIA) 1 MG tablet 0.5 tab daily     • fluticasone (FLONASE) 50 mcg/act nasal spray 2 sprays into each nostril daily     • loratadine-pseudoephedrine (Claritin-D 24 Hour)  mg per 24 hr tablet Take 1 tablet by mouth daily    "  • Multiple Vitamin (Multi-Vitamin Daily) TABS Take 1 tablet by mouth daily     • Cholecalciferol 50 MCG (2000 UT) CAPS Take 1 capsule by mouth daily (Patient not taking: Reported on 2/29/2024)       No current facility-administered medications on file prior to visit.     Family History   Problem Relation Age of Onset   • Hypertension Mother    • Anxiety disorder Mother    • Ulcerative colitis Father    • Anxiety disorder Brother    • Crohn's disease Brother      Social History     Socioeconomic History   • Marital status: Single     Spouse name: Not on file   • Number of children: Not on file   • Years of education: Not on file   • Highest education level: Not on file   Occupational History   • Not on file   Tobacco Use   • Smoking status: Never   • Smokeless tobacco: Never   Vaping Use   • Vaping status: Never Used   Substance and Sexual Activity   • Alcohol use: Never   • Drug use: Never   • Sexual activity: Not Currently   Other Topics Concern   • Not on file   Social History Narrative   • Not on file     Social Determinants of Health     Financial Resource Strain: Low Risk  (8/25/2023)    Overall Financial Resource Strain (CARDIA)    • Difficulty of Paying Living Expenses: Not hard at all   Food Insecurity: Not on file   Transportation Needs: No Transportation Needs (8/25/2023)    PRAPARE - Transportation    • Lack of Transportation (Medical): No    • Lack of Transportation (Non-Medical): No   Physical Activity: Not on file   Stress: Not on file   Social Connections: Not on file   Intimate Partner Violence: Not on file   Housing Stability: Not on file     Vitals:    06/12/24 1251   BP: 120/88   Pulse: 72   SpO2: 97%   Weight: 92.5 kg (204 lb)   Height: 5' 7\" (1.702 m)     Results for orders placed or performed in visit on 02/21/24   Comprehensive metabolic panel   Result Value Ref Range    Sodium 137 135 - 147 mmol/L    Potassium 3.6 3.5 - 5.3 mmol/L    Chloride 98 96 - 108 mmol/L    CO2 26 21 - 32 mmol/L    " ANION GAP 13 mmol/L    BUN 10 5 - 25 mg/dL    Creatinine 0.80 0.60 - 1.30 mg/dL    Glucose, Fasting 58 (L) 65 - 99 mg/dL    Calcium 9.7 8.4 - 10.2 mg/dL    AST 16 13 - 39 U/L    ALT 12 7 - 52 U/L    Alkaline Phosphatase 48 34 - 104 U/L    Total Protein 7.2 6.4 - 8.4 g/dL    Albumin 4.6 3.5 - 5.0 g/dL    Total Bilirubin 0.80 0.20 - 1.00 mg/dL    eGFR 119 ml/min/1.73sq m   Hemoglobin A1C   Result Value Ref Range    Hemoglobin A1C 5.1 Normal 4.0-5.6%; PreDiabetic 5.7-6.4%; Diabetic >=6.5%; Glycemic control for adults with diabetes <7.0% %     mg/dl   TSH, 3rd generation with Free T4 reflex   Result Value Ref Range    TSH 3RD GENERATON 0.968 0.450 - 4.500 uIU/mL   Lipid Panel with Direct LDL reflex   Result Value Ref Range    Cholesterol 135 See Comment mg/dL    Triglycerides 43 See Comment mg/dL    HDL, Direct 58 >=40 mg/dL    LDL Calculated 68 0 - 100 mg/dL   CBC and differential   Result Value Ref Range    WBC 9.29 4.31 - 10.16 Thousand/uL    RBC 4.98 3.88 - 5.62 Million/uL    Hemoglobin 14.5 12.0 - 17.0 g/dL    Hematocrit 44.2 36.5 - 49.3 %    MCV 89 82 - 98 fL    MCH 29.1 26.8 - 34.3 pg    MCHC 32.8 31.4 - 37.4 g/dL    RDW 12.6 11.6 - 15.1 %    MPV 11.4 8.9 - 12.7 fL    Platelets 295 149 - 390 Thousands/uL    nRBC 0 /100 WBCs    Segmented % 67 43 - 75 %    Immature Grans % 0 0 - 2 %    Lymphocytes % 25 14 - 44 %    Monocytes % 8 4 - 12 %    Eosinophils Relative 0 0 - 6 %    Basophils Relative 0 0 - 1 %    Absolute Neutrophils 6.14 1.85 - 7.62 Thousands/µL    Absolute Immature Grans 0.03 0.00 - 0.20 Thousand/uL    Absolute Lymphocytes 2.30 0.60 - 4.47 Thousands/µL    Absolute Monocytes 0.75 0.17 - 1.22 Thousand/µL    Eosinophils Absolute 0.03 0.00 - 0.61 Thousand/µL    Basophils Absolute 0.04 0.00 - 0.10 Thousands/µL     Weight (last 2 days)     None        Body mass index is 31.95 kg/m².  BP      Temp      Pulse     Resp      SpO2        Vitals:    06/12/24 1251   Weight: 92.5 kg (204 lb)     Vitals:     "06/12/24 1251   Weight: 92.5 kg (204 lb)       /88   Pulse 72   Ht 5' 7\" (1.702 m)   Wt 92.5 kg (204 lb)   SpO2 97%   BMI 31.95 kg/m²          Physical Exam  Vitals and nursing note reviewed.   Constitutional:       General: He is not in acute distress.     Appearance: Normal appearance. He is well-developed. He is obese. He is not ill-appearing, toxic-appearing or diaphoretic.   HENT:      Head: Normocephalic and atraumatic.      Right Ear: Tympanic membrane and external ear normal.      Left Ear: Tympanic membrane and external ear normal.      Nose: Congestion present.      Mouth/Throat:      Mouth: Oropharynx is clear and moist.   Eyes:      General: No scleral icterus.        Right eye: No discharge.         Left eye: No discharge.      Conjunctiva/sclera: Conjunctivae normal.      Pupils: Pupils are equal, round, and reactive to light.   Cardiovascular:      Rate and Rhythm: Normal rate and regular rhythm.      Heart sounds: Normal heart sounds. No murmur heard.     No friction rub. No gallop.   Pulmonary:      Effort: No respiratory distress.      Breath sounds: No wheezing or rales.   Abdominal:      General: Bowel sounds are normal. There is no distension.      Palpations: Abdomen is soft. There is no mass.      Tenderness: There is no abdominal tenderness. There is no guarding or rebound.   Musculoskeletal:         General: No deformity or edema.      Cervical back: Neck supple.   Lymphadenopathy:      Cervical: No cervical adenopathy.   Neurological:      Mental Status: He is alert.   Psychiatric:         Mood and Affect: Mood is anxious. Mood is not depressed.         Thought Content: Thought content does not include suicidal ideation.         "

## 2024-06-16 PROBLEM — R09.81 SINUS CONGESTION: Status: ACTIVE | Noted: 2024-06-16

## 2024-06-16 NOTE — ASSESSMENT & PLAN NOTE
Increased anxiety secondary to dynamics within the family related to his brother being very ill and moving back into the home the patient reporting increased symptoms of anxiety supportive counseling and listening provided today I have asked the patient to try to be more flexible and supportive to his brother as he is recovering from his illness patient has agreed also his father is ill and getting out of the hospital patient is accompanied with his mom today I did talk to him about possibly starting SSRI and I did review the [place] [employer] testing that was completed previously to help improve the accuracy with medication treatment for his anxiety at this point in time the patient would like to hold off but he will let me know in the future if interested also I did recommend that he meet with counselor Yudy Garcia within our practice the patient seems to be interested in this but would like to think about this and let us know in the future if interested.  Today I did provide counseling for the patient for which she was very receptive and we will continue to talk to the patient to help support him and his family through this very stressful time.  I did ask the patient to consider starting to walk on a regular basis and he is very interested.

## 2024-06-16 NOTE — ASSESSMENT & PLAN NOTE
No sign or symptom of active infection he has completed amoxicillin his symptoms are improving he does report to me a mild headache at this point recommend Flonase 2 sprays each nostril once daily, nasal saline lavage kit he has taken a steroid pack we will continue to observe and monitor if symptoms change or worsen he will notify me

## 2024-10-01 ENCOUNTER — TELEPHONE (OUTPATIENT)
Age: 29
End: 2024-10-01

## 2025-01-06 ENCOUNTER — TELEPHONE (OUTPATIENT)
Age: 30
End: 2025-01-06

## 2025-01-06 NOTE — TELEPHONE ENCOUNTER
Contacted Pt. in regards to [facility] Wait List, LVM for pt. to contact [phone], option 3 in regards to scheduling.

## 2025-04-09 ENCOUNTER — VBI (OUTPATIENT)
Dept: ADMINISTRATIVE | Facility: OTHER | Age: 30
End: 2025-04-09

## 2025-04-09 NOTE — TELEPHONE ENCOUNTER
Patient contacted to schedule Annual Wellness Visit.   Patient declined to schedule appointment.     Thank you.  Trudi Garg  [facility]

## 2025-08-01 ENCOUNTER — VBI (OUTPATIENT)
Dept: ADMINISTRATIVE | Facility: OTHER | Age: 30
End: 2025-08-01